# Patient Record
Sex: FEMALE | Race: WHITE | NOT HISPANIC OR LATINO | Employment: UNEMPLOYED | URBAN - METROPOLITAN AREA
[De-identification: names, ages, dates, MRNs, and addresses within clinical notes are randomized per-mention and may not be internally consistent; named-entity substitution may affect disease eponyms.]

---

## 2020-09-17 ENCOUNTER — HOSPITAL ENCOUNTER (INPATIENT)
Facility: CLINIC | Age: 40
LOS: 1 days | Discharge: HOME | DRG: 897 | End: 2020-09-19
Attending: EMERGENCY MEDICINE | Admitting: PSYCHIATRY & NEUROLOGY
Payer: MEDICAID

## 2020-09-17 DIAGNOSIS — F10.220 ALCOHOL DEPENDENCE WITH UNCOMPLICATED INTOXICATION (H): ICD-10-CM

## 2020-09-17 DIAGNOSIS — F10.939 ALCOHOL WITHDRAWAL SYNDROME WITH COMPLICATION, WITH UNSPECIFIED COMPLICATION (H): Primary | ICD-10-CM

## 2020-09-17 LAB
ALBUMIN SERPL-MCNC: 3.6 G/DL (ref 3.4–5)
ALCOHOL BREATH TEST: 0.25 (ref 0–0.01)
ALP SERPL-CCNC: 49 U/L (ref 40–150)
ALT SERPL W P-5'-P-CCNC: 25 U/L (ref 0–50)
AMPHETAMINES UR QL SCN: NEGATIVE
ANION GAP SERPL CALCULATED.3IONS-SCNC: 9 MMOL/L (ref 3–14)
AST SERPL W P-5'-P-CCNC: 27 U/L (ref 0–45)
BARBITURATES UR QL: NEGATIVE
BASOPHILS # BLD AUTO: 0 10E9/L (ref 0–0.2)
BASOPHILS NFR BLD AUTO: 0.2 %
BENZODIAZ UR QL: NEGATIVE
BILIRUB SERPL-MCNC: 0.2 MG/DL (ref 0.2–1.3)
BUN SERPL-MCNC: 15 MG/DL (ref 7–30)
CALCIUM SERPL-MCNC: 8.1 MG/DL (ref 8.5–10.1)
CANNABINOIDS UR QL SCN: NEGATIVE
CHLORIDE SERPL-SCNC: 108 MMOL/L (ref 94–109)
CO2 SERPL-SCNC: 25 MMOL/L (ref 20–32)
COCAINE UR QL: NEGATIVE
CREAT SERPL-MCNC: 0.69 MG/DL (ref 0.52–1.04)
DIFFERENTIAL METHOD BLD: ABNORMAL
EOSINOPHIL # BLD AUTO: 0.1 10E9/L (ref 0–0.7)
EOSINOPHIL NFR BLD AUTO: 0.8 %
ERYTHROCYTE [DISTWIDTH] IN BLOOD BY AUTOMATED COUNT: 11.1 % (ref 10–15)
ETHANOL UR QL SCN: POSITIVE
GFR SERPL CREATININE-BSD FRML MDRD: >90 ML/MIN/{1.73_M2}
GLUCOSE SERPL-MCNC: 127 MG/DL (ref 70–99)
HCG UR QL: NEGATIVE
HCT VFR BLD AUTO: 42.7 % (ref 35–47)
HGB BLD-MCNC: 15.1 G/DL (ref 11.7–15.7)
IMM GRANULOCYTES # BLD: 0 10E9/L (ref 0–0.4)
IMM GRANULOCYTES NFR BLD: 0.1 %
LYMPHOCYTES # BLD AUTO: 2.9 10E9/L (ref 0.8–5.3)
LYMPHOCYTES NFR BLD AUTO: 24.2 %
MAGNESIUM SERPL-MCNC: 2.4 MG/DL (ref 1.6–2.3)
MCH RBC QN AUTO: 34.9 PG (ref 26.5–33)
MCHC RBC AUTO-ENTMCNC: 35.4 G/DL (ref 31.5–36.5)
MCV RBC AUTO: 99 FL (ref 78–100)
MONOCYTES # BLD AUTO: 0.3 10E9/L (ref 0–1.3)
MONOCYTES NFR BLD AUTO: 2.5 %
NEUTROPHILS # BLD AUTO: 8.5 10E9/L (ref 1.6–8.3)
NEUTROPHILS NFR BLD AUTO: 72.2 %
NRBC # BLD AUTO: 0 10*3/UL
NRBC BLD AUTO-RTO: 0 /100
OPIATES UR QL SCN: NEGATIVE
PLATELET # BLD AUTO: 321 10E9/L (ref 150–450)
POTASSIUM SERPL-SCNC: 3.8 MMOL/L (ref 3.4–5.3)
PROT SERPL-MCNC: 7.2 G/DL (ref 6.8–8.8)
RBC # BLD AUTO: 4.33 10E12/L (ref 3.8–5.2)
SODIUM SERPL-SCNC: 142 MMOL/L (ref 133–144)
WBC # BLD AUTO: 11.8 10E9/L (ref 4–11)

## 2020-09-17 PROCEDURE — HZ2ZZZZ DETOXIFICATION SERVICES FOR SUBSTANCE ABUSE TREATMENT: ICD-10-PCS | Performed by: PSYCHIATRY & NEUROLOGY

## 2020-09-17 PROCEDURE — 80053 COMPREHEN METABOLIC PANEL: CPT | Performed by: EMERGENCY MEDICINE

## 2020-09-17 PROCEDURE — 83735 ASSAY OF MAGNESIUM: CPT | Performed by: EMERGENCY MEDICINE

## 2020-09-17 PROCEDURE — 80320 DRUG SCREEN QUANTALCOHOLS: CPT | Performed by: EMERGENCY MEDICINE

## 2020-09-17 PROCEDURE — 82075 ASSAY OF BREATH ETHANOL: CPT | Performed by: EMERGENCY MEDICINE

## 2020-09-17 PROCEDURE — 84443 ASSAY THYROID STIM HORMONE: CPT | Performed by: EMERGENCY MEDICINE

## 2020-09-17 PROCEDURE — 81025 URINE PREGNANCY TEST: CPT | Performed by: EMERGENCY MEDICINE

## 2020-09-17 PROCEDURE — 99284 EMERGENCY DEPT VISIT MOD MDM: CPT | Mod: Z6 | Performed by: EMERGENCY MEDICINE

## 2020-09-17 PROCEDURE — 99285 EMERGENCY DEPT VISIT HI MDM: CPT | Mod: 25 | Performed by: EMERGENCY MEDICINE

## 2020-09-17 PROCEDURE — 81001 URINALYSIS AUTO W/SCOPE: CPT | Performed by: EMERGENCY MEDICINE

## 2020-09-17 PROCEDURE — 80307 DRUG TEST PRSMV CHEM ANLYZR: CPT | Performed by: EMERGENCY MEDICINE

## 2020-09-17 PROCEDURE — 82977 ASSAY OF GGT: CPT | Performed by: EMERGENCY MEDICINE

## 2020-09-17 PROCEDURE — 25800030 ZZH RX IP 258 OP 636: Performed by: EMERGENCY MEDICINE

## 2020-09-17 PROCEDURE — 85025 COMPLETE CBC W/AUTO DIFF WBC: CPT | Performed by: EMERGENCY MEDICINE

## 2020-09-17 RX ORDER — HYDROXYZINE HYDROCHLORIDE 25 MG/1
25-50 TABLET, FILM COATED ORAL EVERY 4 HOURS PRN
Status: ON HOLD | COMMUNITY
End: 2021-10-10

## 2020-09-17 RX ADMIN — SODIUM CHLORIDE 1000 ML: 9 INJECTION, SOLUTION INTRAVENOUS at 22:57

## 2020-09-17 ASSESSMENT — ENCOUNTER SYMPTOMS
FEVER: 0
CONFUSION: 0
SHORTNESS OF BREATH: 0
EYE REDNESS: 0
HEADACHES: 0
ARTHRALGIAS: 0
COLOR CHANGE: 0
DIFFICULTY URINATING: 0
ABDOMINAL PAIN: 0
NECK STIFFNESS: 0

## 2020-09-18 LAB
ALBUMIN UR-MCNC: NEGATIVE MG/DL
APPEARANCE UR: ABNORMAL
BACTERIA #/AREA URNS HPF: ABNORMAL /HPF
BILIRUB UR QL STRIP: NEGATIVE
COLOR UR AUTO: ABNORMAL
FOLATE SERPL-MCNC: 5.1 NG/ML
GGT SERPL-CCNC: 22 U/L (ref 0–40)
GLUCOSE UR STRIP-MCNC: NEGATIVE MG/DL
HGB UR QL STRIP: ABNORMAL
KETONES UR STRIP-MCNC: NEGATIVE MG/DL
LABORATORY COMMENT REPORT: NORMAL
LEUKOCYTE ESTERASE UR QL STRIP: NEGATIVE
MUCOUS THREADS #/AREA URNS LPF: PRESENT /LPF
NITRATE UR QL: NEGATIVE
PH UR STRIP: 6.5 PH (ref 5–7)
RBC #/AREA URNS AUTO: 0 /HPF (ref 0–2)
SARS-COV-2 RNA SPEC QL NAA+PROBE: NEGATIVE
SARS-COV-2 RNA SPEC QL NAA+PROBE: NORMAL
SOURCE: ABNORMAL
SP GR UR STRIP: 1.01 (ref 1–1.03)
SPECIMEN SOURCE: NORMAL
SPECIMEN SOURCE: NORMAL
SQUAMOUS #/AREA URNS AUTO: 27 /HPF (ref 0–1)
TSH SERPL DL<=0.005 MIU/L-ACNC: 1.14 MU/L (ref 0.4–4)
UROBILINOGEN UR STRIP-MCNC: NORMAL MG/DL (ref 0–2)
VIT B12 SERPL-MCNC: 342 PG/ML (ref 193–986)
WBC #/AREA URNS AUTO: 1 /HPF (ref 0–5)

## 2020-09-18 PROCEDURE — 25000132 ZZH RX MED GY IP 250 OP 250 PS 637: Performed by: NURSE PRACTITIONER

## 2020-09-18 PROCEDURE — 12800008 ZZH R&B CD ADULT

## 2020-09-18 PROCEDURE — 82607 VITAMIN B-12: CPT | Performed by: NURSE PRACTITIONER

## 2020-09-18 PROCEDURE — 99207 ZZC CONSULT E&M CHANGED TO SUBSEQUENT LEVEL: CPT | Performed by: NURSE PRACTITIONER

## 2020-09-18 PROCEDURE — 99221 1ST HOSP IP/OBS SF/LOW 40: CPT | Mod: 95 | Performed by: PSYCHIATRY & NEUROLOGY

## 2020-09-18 PROCEDURE — 99231 SBSQ HOSP IP/OBS SF/LOW 25: CPT | Performed by: NURSE PRACTITIONER

## 2020-09-18 PROCEDURE — 99207 ZZC CDG-HISTORY COMP: MEETS EXP. PROBLEM FOCUSED-DOWN CODED LACK OF ROS: CPT | Performed by: PSYCHIATRY & NEUROLOGY

## 2020-09-18 PROCEDURE — 36415 COLL VENOUS BLD VENIPUNCTURE: CPT | Performed by: NURSE PRACTITIONER

## 2020-09-18 PROCEDURE — 82746 ASSAY OF FOLIC ACID SERUM: CPT | Performed by: NURSE PRACTITIONER

## 2020-09-18 PROCEDURE — 25000132 ZZH RX MED GY IP 250 OP 250 PS 637: Performed by: EMERGENCY MEDICINE

## 2020-09-18 PROCEDURE — U0003 INFECTIOUS AGENT DETECTION BY NUCLEIC ACID (DNA OR RNA); SEVERE ACUTE RESPIRATORY SYNDROME CORONAVIRUS 2 (SARS-COV-2) (CORONAVIRUS DISEASE [COVID-19]), AMPLIFIED PROBE TECHNIQUE, MAKING USE OF HIGH THROUGHPUT TECHNOLOGIES AS DESCRIBED BY CMS-2020-01-R: HCPCS | Performed by: NURSE PRACTITIONER

## 2020-09-18 RX ORDER — FOLIC ACID 1 MG/1
1 TABLET ORAL DAILY
Status: DISCONTINUED | OUTPATIENT
Start: 2020-09-18 | End: 2020-09-19 | Stop reason: HOSPADM

## 2020-09-18 RX ORDER — ALUMINA, MAGNESIA, AND SIMETHICONE 2400; 2400; 240 MG/30ML; MG/30ML; MG/30ML
30 SUSPENSION ORAL EVERY 4 HOURS PRN
Status: DISCONTINUED | OUTPATIENT
Start: 2020-09-18 | End: 2020-09-19 | Stop reason: HOSPADM

## 2020-09-18 RX ORDER — DIAZEPAM 5 MG
5-20 TABLET ORAL EVERY 30 MIN PRN
Status: DISCONTINUED | OUTPATIENT
Start: 2020-09-18 | End: 2020-09-19 | Stop reason: HOSPADM

## 2020-09-18 RX ORDER — ACETAMINOPHEN 325 MG/1
650 TABLET ORAL EVERY 4 HOURS PRN
Status: DISCONTINUED | OUTPATIENT
Start: 2020-09-18 | End: 2020-09-19 | Stop reason: HOSPADM

## 2020-09-18 RX ORDER — IBUPROFEN 600 MG/1
600 TABLET, FILM COATED ORAL ONCE
Status: COMPLETED | OUTPATIENT
Start: 2020-09-18 | End: 2020-09-18

## 2020-09-18 RX ORDER — HYDROXYZINE HYDROCHLORIDE 25 MG/1
25 TABLET, FILM COATED ORAL 3 TIMES DAILY PRN
Status: DISCONTINUED | OUTPATIENT
Start: 2020-09-18 | End: 2020-09-19 | Stop reason: HOSPADM

## 2020-09-18 RX ORDER — MULTIPLE VITAMINS W/ MINERALS TAB 9MG-400MCG
1 TAB ORAL DAILY
Status: DISCONTINUED | OUTPATIENT
Start: 2020-09-18 | End: 2020-09-19 | Stop reason: HOSPADM

## 2020-09-18 RX ORDER — BISACODYL 10 MG
10 SUPPOSITORY, RECTAL RECTAL DAILY PRN
Status: DISCONTINUED | OUTPATIENT
Start: 2020-09-18 | End: 2020-09-19 | Stop reason: HOSPADM

## 2020-09-18 RX ORDER — ATENOLOL 50 MG/1
50 TABLET ORAL DAILY PRN
Status: DISCONTINUED | OUTPATIENT
Start: 2020-09-18 | End: 2020-09-19 | Stop reason: HOSPADM

## 2020-09-18 RX ORDER — TRAZODONE HYDROCHLORIDE 50 MG/1
50 TABLET, FILM COATED ORAL
Status: DISCONTINUED | OUTPATIENT
Start: 2020-09-18 | End: 2020-09-19 | Stop reason: HOSPADM

## 2020-09-18 RX ORDER — LANOLIN ALCOHOL/MO/W.PET/CERES
100 CREAM (GRAM) TOPICAL DAILY
Status: DISCONTINUED | OUTPATIENT
Start: 2020-09-18 | End: 2020-09-19 | Stop reason: HOSPADM

## 2020-09-18 RX ORDER — HYDROXYZINE HYDROCHLORIDE 25 MG/1
25 TABLET, FILM COATED ORAL EVERY 4 HOURS PRN
Status: DISCONTINUED | OUTPATIENT
Start: 2020-09-18 | End: 2020-09-19 | Stop reason: HOSPADM

## 2020-09-18 RX ADMIN — HYDROXYZINE HYDROCHLORIDE 25 MG: 25 TABLET, FILM COATED ORAL at 20:08

## 2020-09-18 RX ADMIN — IBUPROFEN 600 MG: 600 TABLET ORAL at 04:38

## 2020-09-18 RX ADMIN — TRAZODONE HYDROCHLORIDE 50 MG: 50 TABLET ORAL at 20:08

## 2020-09-18 RX ADMIN — DIAZEPAM 10 MG: 5 TABLET ORAL at 05:36

## 2020-09-18 RX ADMIN — MULTIPLE VITAMINS W/ MINERALS TAB 1 TABLET: TAB at 09:32

## 2020-09-18 RX ADMIN — FOLIC ACID 1 MG: 1 TABLET ORAL at 09:32

## 2020-09-18 RX ADMIN — Medication 100 MG: at 09:32

## 2020-09-18 RX ADMIN — DIAZEPAM 10 MG: 5 TABLET ORAL at 09:32

## 2020-09-18 ASSESSMENT — MIFFLIN-ST. JEOR: SCORE: 1101.34

## 2020-09-18 ASSESSMENT — ACTIVITIES OF DAILY LIVING (ADL)
ORAL_HYGIENE: INDEPENDENT
FALL_HISTORY_WITHIN_LAST_SIX_MONTHS: NO
TRANSFERRING: 0-->INDEPENDENT
AMBULATION: 0-->INDEPENDENT
COGNITION: 0 - NO COGNITION ISSUES REPORTED
RETIRED_COMMUNICATION: 0-->UNDERSTANDS/COMMUNICATES WITHOUT DIFFICULTY
RETIRED_EATING: 0-->INDEPENDENT
HYGIENE/GROOMING: INDEPENDENT
SWALLOWING: 0-->SWALLOWS FOODS/LIQUIDS WITHOUT DIFFICULTY
DRESS: INDEPENDENT
DRESS: 0-->INDEPENDENT
TOILETING: 0-->INDEPENDENT
BATHING: 0-->INDEPENDENT

## 2020-09-18 NOTE — ED TRIAGE NOTES
Binge drinking for 3 days. Struggling with ETOH for years; 1L a day; denies any other drug use. Pt unable to track but family states SI statements

## 2020-09-18 NOTE — ED NOTES
ED to Behavioral Floor Handoff    SITUATION  Adri Delarosa is a 39 year old female who speaks Data Unavailable and lives in a home with family members The patient arrived in the ED by private car from home with a complaint of Alcohol Intoxication and Suicidal (possible SI, pt unable to track, but family states multiple statements about SI)  .The patient's current symptoms started/worsened 1 year(s) ago and during this time the symptoms have increased.   In the ED, pt was diagnosed with   Final diagnoses:   Alcohol dependence with uncomplicated intoxication (H)        Initial vitals were: BP: 129/85  Pulse: 94  Temp: 98.5  F (36.9  C)  Resp: 12  SpO2: 97 %   --------  Is the patient diabetic? No   If yes, last blood glucose? --     If yes, was this treated in the ED? --  --------  Is the patient inebriated (ETOH) Yes or Impaired on other substances? No  MSSA done? N/A  Last MSSA score: --    Were withdrawal symptoms treated? N/A  Does the patient have a seizure history? No. If yes, date of most recent seizure--  --------  Is the patient patient experiencing suicidal ideation? reports occasional suicidal thoughts representing feeling that life is not worth feeling    Homicidal ideation? denies current or recent homicidal ideation or behaviors.    Self-injurious behavior/urges? denies current or recent self injurious behavior or ideation.  ------  Was pt aggressive in the ED No  Was a code called No  Is the pt now cooperative? Yes  -------  Meds given in ED:   Medications   0.9% sodium chloride BOLUS (1,000 mLs Intravenous New Bag 9/17/20 5623)      Family present during ED course? Yes  Family currently present? Yes    BACKGROUND  Does the patient have a cognitive impairment or developmental disability? No  Allergies: No Known Allergies.   Social demographics are   Social History     Socioeconomic History     Marital status: Not on file     Spouse name: Not on file     Number of children: Not on file     Years of  education: Not on file     Highest education level: Not on file   Occupational History     Not on file   Social Needs     Financial resource strain: Not on file     Food insecurity     Worry: Not on file     Inability: Not on file     Transportation needs     Medical: Not on file     Non-medical: Not on file   Tobacco Use     Smoking status: Not on file   Substance and Sexual Activity     Alcohol use: Not on file     Drug use: Not on file     Sexual activity: Not on file   Lifestyle     Physical activity     Days per week: Not on file     Minutes per session: Not on file     Stress: Not on file   Relationships     Social connections     Talks on phone: Not on file     Gets together: Not on file     Attends Mu-ism service: Not on file     Active member of club or organization: Not on file     Attends meetings of clubs or organizations: Not on file     Relationship status: Not on file     Intimate partner violence     Fear of current or ex partner: Not on file     Emotionally abused: Not on file     Physically abused: Not on file     Forced sexual activity: Not on file   Other Topics Concern     Not on file   Social History Narrative     Not on file        ASSESSMENT  Labs results   Labs Ordered and Resulted from Time of ED Arrival Up to the Time of Departure from the ED   DRUG ABUSE SCREEN 6 CHEM DEP URINE ([facility]) - Abnormal; Notable for the following components:       Result Value    Ethanol Qual Urine Positive (*)     All other components within normal limits   COMPREHENSIVE METABOLIC PANEL - Abnormal; Notable for the following components:    Glucose 127 (*)     Calcium 8.1 (*)     All other components within normal limits   CBC WITH PLATELETS DIFFERENTIAL - Abnormal; Notable for the following components:    WBC 11.8 (*)     MCH 34.9 (*)     Absolute Neutrophil 8.5 (*)     All other components within normal limits   MAGNESIUM - Abnormal; Notable for the following components:    Magnesium 2.4 (*)     All other  components within normal limits   ROUTINE UA WITH MICROSCOPIC - Abnormal; Notable for the following components:    Blood Urine Trace (*)     Bacteria Urine Few (*)     Squamous Epithelial /HPF Urine 27 (*)     Mucous Urine Present (*)     All other components within normal limits   ALCOHOL BREATH TEST POCT - Abnormal; Notable for the following components:    Alcohol Breath Test 0.246 (*)     All other components within normal limits   HCG QUALITATIVE URINE   COVID-19 VIRUS (CORONAVIRUS) BY PCR      Imaging Studies: No results found for this or any previous visit (from the past 24 hour(s)).   Most recent vital signs /85   Pulse 94   Temp 98.5  F (36.9  C) (Oral)   Resp 12   SpO2 97%    Abnormal labs/tests/findings requiring intervention:---   Pain control: pt had none  Nausea control: pt had none    RECOMMENDATION  Are any infection precautions needed (MRSA, VRE, etc.)? No If yes, what infection? --  ---  Does the patient have mobility issues? independently. If yes, what device does the pt use? ---  ---  Is patient on 72 hour hold or commitment? No If on 72 hour hold, have hold and rights been given to patient? N/A  Are admitting orders written if after 10 p.m. ?N/A  Tasks needing to be completed:---     Angeles Scott, RN    [phone] [facility]

## 2020-09-18 NOTE — H&P
"Admitted:     09/17/2020      IDENTIFYING INFORMATION:  The patient is a 39-year-old  female.  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      CHIEF COMPLAINT:  \"Got drunk.\"       HISTORY OF PRESENT ILLNESS:  The patient has been drinking since age of 14.  At 28, it became more of a problem.  She relapsed after 2-1/2 years of sobriety and was binge drinking.  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  She has tolerance to alcohol, withdrawal, progressive use, loss of control, and tried to quit unsuccessfully, despite negative consequences such as money.  She has no history of DTS and seizures.  She does not use any drugs.  Smokes 1-2 cigarettes a day.  She does have chronic depression.  She says she is not depressed right now.  She does not have any suicidal ideation, plan or intent.  She says she has her kids.  She has no access to a gun.  She did make 1 prior suicide attempt 2 months ago but gets very irritable when pressed for details about it.  When she gets depressed, her sleep goes down.  Her energy goes down.  Her motivation goes down, and her appetite goes up.  She does not have any auditory or visual hallucinations.  She denies any yusra.  She denies any PTSD.  She denies any psychosis.  She denies any OCD.        PAST PSYCHIATRIC HISTORY:  She was psychiatrically hospitalized once.  She was in treatment at [facility].      MEDICAL HISTORY:  The patient's vitals are as below:        VITAL SIGNS:  Blood pressure 133/76;, pulse is 85; respiratory rate is 16; temperature is 98.        ALLERGIES:  SHE HAS NO KNOWN ALLERGIES.      FAMILY HISTORY:  Denies any alcohol.  No chemical dependency issues in her family.  No psychiatric issues in the family.      SOCIAL HISTORY:  She was born in [place].  She grew up in [place].  She is single, has 3 children.      MENTAL STATUS EXAMINATION:  The patient is a 39-year-old  female lying in bed.  She " has poor grooming, poor hygiene, and poor eye contact.  Mood is depressed.  Affect is congruent.  Speech is spontaneous, normal in rate, rhythm and volume.  Psychomotor retardation is seen.  Linear thought process, no loosening of association.  Does not have any active suicidal ideation, plan or intent.  Insight and judgment are limited.  Alert and oriented times 3.  Attention, concentration is intact.  Recent and remote memory intact.  Language and fund of knowledge intact.  Normal muscle strength, normal gait.      DIAGNOSES:   Axis I:   1.  Alcohol use disorder, severe.   2.  Alcohol withdrawal, severe.   3.  Major depressive disorder, moderate, recurrent, without psychotic features.      PLAN:  The patient will be detoxed off alcohol using MSSA protocol on Valium.  The patient has tremor, sleep disturbance, agitation, and paroxysmal sweats.  Her pulse is 85, blood pressure 123/76.  She has scored 9 and 9 on the MSSA and has required 20 mg of Valium.  The patient had lab work done, which are basically essentially normal.  Her folate is low at 5.4, but she is getting folate replacement right now.  She will be seen by Medicine for it.  She has stopped taking Prozac for her depression.  Initially, she will started back on Prozac 20 mg.      I did medication reconciliation and provided psychoeducation.  I have reviewed labs with the patient and talked about this with the patient.  I have reviewed and summarized old records including medication, reconciliation of home medications and PDMP.  I have spoken with the nurse about medications and withdrawal scores.  I have spoken with the  about her treatment options.         EMILIE BLUE MD             D: 2020   T: 2020   MT:       Name:     HECTOR PAUL   MRN:      [id number]        Account:      [id number]   :      1980        Admitted:     2020                   Document: [id number]

## 2020-09-18 NOTE — PROGRESS NOTES
Met with Pt to initiate discharge planning.  Pt reports she is working on getting into [facility].  Pt is in bed and sleepy.  Will offer assistance as Pt condition improves.

## 2020-09-18 NOTE — PLAN OF CARE
Continues in detox status on alcohol withdrawal protocol. MSSA 9 and 7. Medicated x1 with Valium 10 mg. for symptoms of alcohol withdrawal.Appetite fair. Fluids encouraged. Spent most of the day resting in bed. Agreed to Covid test.Denies suicide ideation and thoughts of self harm. Will continue to monitor.

## 2020-09-18 NOTE — PLAN OF CARE
Behavioral Team Discussion: (9/18/2020)    Continued Stay Criteria/Rationale: Patient admitted for alcohol withdrawal and alcohol Use Disorder.  Plan: The following services will be provided to the patient; psychiatric assessment, medication management, therapeutic milieu, individual and group support, and skills groups.   Participants: 3A Provider: Dr. Pia Chen MD; 3A RN's: Carolynn Espinal, RN; 3A CM's: Colette Boswell .  Summary/Recommendation: Providers will assess today for treatment recommendations, discharge planning, and aftercare plans. CM will meet with pt for discharge planning.   Medical/Physical: None noted  Precautions:   Behavioral Orders   Procedures     Code 1 - Restrict to Unit     Routine Programming     As clinically indicated     Status 15     Every 15 minutes.     Withdrawal precautions     Rationale for change in precautions or plan: N/A  Progress: No Change.

## 2020-09-18 NOTE — H&P
Telemedicine Visit: The patient's condition can be safely assessed and treated via synchronous audio and visual telemedicine encounter.   Start Time: 8.46  Stop Time: 9.04  Reason for Telemedicine Visit: Covid-19   Originating Site (Patient Location): Station    Distant Site (Provider Location): Provider Remote Setting   Consent: The patient/guardian has verbally consented to: the potential risks and benefits of telemedicine (video visit) versus in person care; bill my insurance or make self-payment for services provided; and responsibility for payment of non-covered services.   Mode of Communication: Video Conference via polycom  As the provider I attest to compliance with applicable laws and regulations related to telemedicine.       The patient/guardian has been notified of the following:   This telemedicine visit is conducted live between you and your clinician. We have found that certain health care needs can be provided without the need for a physical exam. This service lets us provide the care you need with a telemedicine conversation.      [id number]

## 2020-09-18 NOTE — CONSULTS
See consult note from 12:57.  Written as progress note. This note is to satisfy order.     Freya May, MPH, [facility]  Hospitalist Service  Pager [phone]

## 2020-09-18 NOTE — PHARMACY-ADMISSION MEDICATION HISTORY
Admission Medication History Completed by Pharmacy    See [facility] Admission Navigator for allergy information, preferred outpatient pharmacy, prior to admission medications and immunization status.     Medication History Sources:     Patient    Changes made to PTA medication list (reason):    Added: Fluoxetine    Deleted: None    Changed: hydroxyzine -> 25-50 mg Q4 hours PRN anxiety    Additional Information:    Medications per patient. Unable to locate information about medications via Surescripts or Care Everywhere.    Prior to Admission medications    Medication Sig Last Dose Taking? Auth Provider   FLUoxetine (PROZAC) 10 MG capsule Take 10 mg by mouth daily Never started Yes Unknown, Entered By History   hydrOXYzine (ATARAX) 25 MG tablet Take 25-50 mg by mouth every 4 hours as needed for anxiety  9/17/2020 at Unknown time Yes Reported, Patient       Date completed: 09/18/20    Medication history completed by: Nano Rinaldi, PharmD, BCPP

## 2020-09-18 NOTE — PROGRESS NOTES
[facility]  Consult Note - Hospitalist Service     Date of Admission:  September 17, 2020   Consult Requested by: Dr. Chen, psychiatry   Reason for Consult: Mild leukocytosis in setting of alcohol withdrawal     Assessment & Plan   Adri Delarosa is a 39 year old female admitted on 9/17/2020.  She has a past medical history of heavy alcohol abuse (drinking 1L per day) who is admitted to station 3A seeking detoxification from alcohol.  Internal medicine has been consulted for mild leukocytosis in setting of alcohol withdrawal.     #Alcohol dependence with acute withdrawal  Pt has been drinking heavily for the past year, currently drinking 1L/day for past several days.  Last drink 9/17.  LFTs WNL.    - Agree with MSSA  - Agree with MVI, thiamine, folic acid  - Care per primary psychiatry team  - Encourage PO fluids    #Mild leukocytosis   WBC 11.8, afebrile without any concerning findings on exam.  No concern for infection at this time.   - Follow up with PCP within one week for repeat CBC (placed in discharge orders)    Currently, this patient is medically stable and medicine will sign off. Please page the Internal Medicine job code pager for any intercurrent medical issues which arise. Thank you for the opportunity to be a part of this patient's care.    The patient's care was discussed with the Bedside Nurse, Patient and Primary team.    Freya May NP  [facility]    ______________________________________________________________________    Chief Complaint   Mild leukocytosis in setting of acute alcohol withdrawal.    History is obtained from the patient and electronic health record    History of Present Illness   Adir Delarosa is a 39 year old female who has a past medical history of heavy alcohol abuse who is admitted to station 3A seeking detoxification from alcohol.  Internal medicine has been consulted for mild  "leukocytosis in setting of alcohol withdrawal.     Currently, she has no medical complaints.  Denies fevers, chills, nausea, vomiting, chest pain or shortness of breath.  No abdominal pain or dysuria.      Review of Systems   The 10 point Review of Systems is negative other than noted in the HPI or here.     Past Medical History    I have reviewed this patient's medical history and updated it with pertinent information if needed.   Past Medical History:   Diagnosis Date     Alcohol abuse       Past Surgical History   I have reviewed this patient's surgical history and updated it with pertinent information if needed.  No past surgical history on file.     Social History   I have reviewed this patient's social history and updated it with pertinent information if needed.  Social History     Tobacco Use     Smoking status: Not on file   Substance Use Topics     Alcohol use: Not on file     Drug use: Not on file     Family History   I have reviewed this patient's family history and updated it with pertinent information if needed.   No significant family history     Medications   I have reviewed this patient's current medications    Allergies   No Known Allergies    Physical Exam   Vital signs:  Temp: 97.5  F (36.4  C) Temp src: Temporal BP: (!) 128/90 Pulse: 94   Resp: 16 SpO2: 98 % O2 Device: None (Room air)   Height: 154.9 cm (5' 1\") Weight: 49.9 kg (110 lb)  Estimated body mass index is 20.78 kg/m  as calculated from the following:    Height as of this encounter: 1.549 m (5' 1\").    Weight as of this encounter: 49.9 kg (110 lb).  Weight: 0 lbs 0 oz    General Appearance: NAD, pleasant and interactive.   Eyes: PERRLA  HEENT: MMM, no lymphadenopathy.    Respiratory: Normal effort on RA, lungs CTAB.  No wheezing.   Cardiovascular: RRR, S1S2.  No murmurs appreciated.   GI: Abdomen soft, non-tender, non-distended. Bowel sounds hypoactive throughout.   Skin: No jaundice.  No rashes or open wounds on visualized skin. "   Musculoskeletal: No joint swelling or tenderness .  Neurologic: A+O x 3, no focal deficits.   Psychiatric: Mood stable.     Data   Results for orders placed or performed during the hospital encounter of 09/17/20 (from the past 24 hour(s))   Alcohol breath test POCT   Result Value Ref Range    Alcohol Breath Test 0.246 (A) 0.00 - 0.01   HCG qualitative urine   Result Value Ref Range    HCG Qual Urine Negative NEG^Negative   Drug abuse screen 6 urine (chem dep)   Result Value Ref Range    Amphetamine Qual Urine Negative NEG^Negative    Barbiturates Qual Urine Negative NEG^Negative    Benzodiazepine Qual Urine Negative NEG^Negative    Cannabinoids Qual Urine Negative NEG^Negative    Cocaine Qual Urine Negative NEG^Negative    Ethanol Qual Urine Positive (A) NEG^Negative    Opiates Qualitative Urine Negative NEG^Negative   UA with Microscopic   Result Value Ref Range    Color Urine Light Yellow     Appearance Urine Slightly Cloudy     Glucose Urine Negative NEG^Negative mg/dL    Bilirubin Urine Negative NEG^Negative    Ketones Urine Negative NEG^Negative mg/dL    Specific Gravity Urine 1.006 1.003 - 1.035    Blood Urine Trace (A) NEG^Negative    pH Urine 6.5 5.0 - 7.0 pH    Protein Albumin Urine Negative NEG^Negative mg/dL    Urobilinogen mg/dL Normal 0.0 - 2.0 mg/dL    Nitrite Urine Negative NEG^Negative    Leukocyte Esterase Urine Negative NEG^Negative    Source Urine     WBC Urine 1 0 - 5 /HPF    RBC Urine 0 0 - 2 /HPF    Bacteria Urine Few (A) NEG^Negative /HPF    Squamous Epithelial /HPF Urine 27 (H) 0 - 1 /HPF    Mucous Urine Present (A) NEG^Negative /LPF   Comprehensive metabolic panel   Result Value Ref Range    Sodium 142 133 - 144 mmol/L    Potassium 3.8 3.4 - 5.3 mmol/L    Chloride 108 94 - 109 mmol/L    Carbon Dioxide 25 20 - 32 mmol/L    Anion Gap 9 3 - 14 mmol/L    Glucose 127 (H) 70 - 99 mg/dL    Urea Nitrogen 15 7 - 30 mg/dL    Creatinine 0.69 0.52 - 1.04 mg/dL    GFR Estimate >90 >60 mL/min/[1.73_m2]     GFR Estimate If Black >90 >60 mL/min/[1.73_m2]    Calcium 8.1 (L) 8.5 - 10.1 mg/dL    Bilirubin Total 0.2 0.2 - 1.3 mg/dL    Albumin 3.6 3.4 - 5.0 g/dL    Protein Total 7.2 6.8 - 8.8 g/dL    Alkaline Phosphatase 49 40 - 150 U/L    ALT 25 0 - 50 U/L    AST 27 0 - 45 U/L   CBC with platelets differential   Result Value Ref Range    WBC 11.8 (H) 4.0 - 11.0 10e9/L    RBC Count 4.33 3.8 - 5.2 10e12/L    Hemoglobin 15.1 11.7 - 15.7 g/dL    Hematocrit 42.7 35.0 - 47.0 %    MCV 99 78 - 100 fl    MCH 34.9 (H) 26.5 - 33.0 pg    MCHC 35.4 31.5 - 36.5 g/dL    RDW 11.1 10.0 - 15.0 %    Platelet Count 321 150 - 450 10e9/L    Diff Method Automated Method     % Neutrophils 72.2 %    % Lymphocytes 24.2 %    % Monocytes 2.5 %    % Eosinophils 0.8 %    % Basophils 0.2 %    % Immature Granulocytes 0.1 %    Nucleated RBCs 0 0 /100    Absolute Neutrophil 8.5 (H) 1.6 - 8.3 10e9/L    Absolute Lymphocytes 2.9 0.8 - 5.3 10e9/L    Absolute Monocytes 0.3 0.0 - 1.3 10e9/L    Absolute Eosinophils 0.1 0.0 - 0.7 10e9/L    Absolute Basophils 0.0 0.0 - 0.2 10e9/L    Abs Immature Granulocytes 0.0 0 - 0.4 10e9/L    Absolute Nucleated RBC 0.0    Magnesium   Result Value Ref Range    Magnesium 2.4 (H) 1.6 - 2.3 mg/dL   GGT   Result Value Ref Range    GGT 22 0 - 40 U/L   TSH with free T4 reflex   Result Value Ref Range    TSH 1.14 0.40 - 4.00 mU/L   Folate   Result Value Ref Range    Folate 5.1 (L) >5.4 ng/mL   Vitamin B12   Result Value Ref Range    Vitamin B12 342 193 - 986 pg/mL   Asymptomatic COVID-19 Virus (Coronavirus) by PCR    Specimen: Nasopharyngeal   Result Value Ref Range    COVID-19 Virus PCR to [facility] - Source Nasopharyngeal     COVID-19 Virus PCR to [facility] - Result       Test received-See reflex to IDDL test SARS CoV2 (COVID-19) Virus RT-PCR

## 2020-09-18 NOTE — ED NOTES
"Pt refused COVID swab, states she was \"Just tested\". Rn explained that the patient was still out in the community and needs to be retested. Continues to refuse  "

## 2020-09-18 NOTE — ED NOTES
Writer showed the Behavioral video to patient . Patient informed that since she refuse the Covid test, pt will be staying in an isolation room where she cannot go out. Patient agreeable.

## 2020-09-18 NOTE — PROGRESS NOTES
**RN Admission Note**    S: Patient is a 39 year old year old female admitted for Alcohol dependence with uncomplicated intoxication (H) [F10.220]  BAL upon arrival was 0.26.  Pt refused covid test in ED.    B: Patient was brought to [facility] ED    Patient stated she had stopped drinking for 2 1/2 years and started drinking 1 liter a day for the past 4 days.  Pt denies being suicidal. Pt has a long hx of drinking etoh.  Pt stated she had been to [place] x3.    A:Patient presents irritable and labile.  MSSA score was 9.  She received valium 10 mg po.  Pt stated she tried to commit suicide 2 months ago by overdosing on pills.  Pt was irritable and tired refused to answer suicide questions although she denies being suicidal currently.      /88   Pulse 75   Temp 97.8  F (36.6  C) (Oral)   Resp 16   SpO2 97%     R: Patient was given a tour of the unit, a copy of the patient bill of rights, and a snack. Admission orders were placed.  Unit staff will continue to monitor and assess patient's condition, use behavioral interventions as indicated, provide education, and offer reassurance and support.  Goals on the unit include: medication compliance, ADL adherence, participation in groups, development of coping skills, and stabilization for discharge.    Orders Placed This Encounter      Withdrawal precautions

## 2020-09-18 NOTE — ED PROVIDER NOTES
ED Provider Note  [facility]      History     Chief Complaint   Patient presents with     Alcohol Intoxication     Suicidal     possible SI, pt unable to track, but family states multiple statements about SI     HPI  Adri Delarosa is a 39 year old female who presents to the emergency department seeking detox from alcohol.  The patient states that she has been drinking heavily for the past year.  She states that she has been drinking 1 L/day over the past several days.  She becomes tremulous when she does not drink.  She denies a history of alcohol withdrawal seizures or DTs.  Her last drink was just before coming to the emergency department this evening.  The patient denies any problems with depression or suicidal ideations.  Apparently, the family told the triage nurse that she has been making statements about suicide today.  Patient denies any recent fall or injury.  She denies any recent illness or medical concerns including fever, cough, shortness of breath, abdominal pain, vomiting, and diarrhea.    Past Medical History  No past medical history on file.  No past surgical history on file.  hydrOXYzine (ATARAX) 25 MG tablet      No Known Allergies  Family History  No family history on file.  Social History   Social History     Tobacco Use     Smoking status: Not on file   Substance Use Topics     Alcohol use: Not on file     Drug use: Not on file      Past medical history, past surgical history, medications, allergies, family history, and social history were reviewed with the patient. No additional pertinent items.       Review of Systems   Constitutional: Negative for fever.   HENT: Negative for congestion.    Eyes: Negative for redness.   Respiratory: Negative for shortness of breath.    Cardiovascular: Negative for chest pain.   Gastrointestinal: Negative for abdominal pain.   Genitourinary: Negative for difficulty urinating.   Musculoskeletal: Negative for arthralgias and neck  stiffness.   Skin: Negative for color change.   Neurological: Negative for headaches.   Psychiatric/Behavioral: Negative for confusion.   All other systems reviewed and are negative.    A complete review of systems was performed with pertinent positives and negatives noted in the HPI, and all other systems negative.    Physical Exam   BP: 129/85  Pulse: 94  Temp: 98.5  F (36.9  C)  Resp: 12  SpO2: 97 %  Physical Exam  Vitals signs and nursing note reviewed.   Constitutional:       General: She is not in acute distress.     Appearance: She is not diaphoretic.   HENT:      Head: Atraumatic.      Mouth/Throat:      Pharynx: No oropharyngeal exudate.   Eyes:      General: No scleral icterus.     Pupils: Pupils are equal, round, and reactive to light.   Cardiovascular:      Heart sounds: Normal heart sounds.   Pulmonary:      Effort: No respiratory distress.      Breath sounds: Normal breath sounds.   Abdominal:      General: Bowel sounds are normal.      Palpations: Abdomen is soft.      Tenderness: There is no abdominal tenderness.   Musculoskeletal:         General: No tenderness.   Skin:     General: Skin is warm.      Findings: No rash.   Neurological:      General: No focal deficit present.   Psychiatric:         Speech: Speech is slurred.         Thought Content: Thought content does not include suicidal ideation.         ED Course      Procedures        Results for orders placed or performed during the hospital encounter of 09/17/20   HCG qualitative urine     Status: None   Result Value Ref Range    HCG Qual Urine Negative NEG^Negative   Drug abuse screen 6 urine (chem dep)     Status: Abnormal   Result Value Ref Range    Amphetamine Qual Urine Negative NEG^Negative    Barbiturates Qual Urine Negative NEG^Negative    Benzodiazepine Qual Urine Negative NEG^Negative    Cannabinoids Qual Urine Negative NEG^Negative    Cocaine Qual Urine Negative NEG^Negative    Ethanol Qual Urine Positive (A) NEG^Negative     Opiates Qualitative Urine Negative NEG^Negative   Comprehensive metabolic panel     Status: Abnormal   Result Value Ref Range    Sodium 142 133 - 144 mmol/L    Potassium 3.8 3.4 - 5.3 mmol/L    Chloride 108 94 - 109 mmol/L    Carbon Dioxide 25 20 - 32 mmol/L    Anion Gap 9 3 - 14 mmol/L    Glucose 127 (H) 70 - 99 mg/dL    Urea Nitrogen 15 7 - 30 mg/dL    Creatinine 0.69 0.52 - 1.04 mg/dL    GFR Estimate >90 >60 mL/min/[1.73_m2]    GFR Estimate If Black >90 >60 mL/min/[1.73_m2]    Calcium 8.1 (L) 8.5 - 10.1 mg/dL    Bilirubin Total 0.2 0.2 - 1.3 mg/dL    Albumin 3.6 3.4 - 5.0 g/dL    Protein Total 7.2 6.8 - 8.8 g/dL    Alkaline Phosphatase 49 40 - 150 U/L    ALT 25 0 - 50 U/L    AST 27 0 - 45 U/L   CBC with platelets differential     Status: Abnormal   Result Value Ref Range    WBC 11.8 (H) 4.0 - 11.0 10e9/L    RBC Count 4.33 3.8 - 5.2 10e12/L    Hemoglobin 15.1 11.7 - 15.7 g/dL    Hematocrit 42.7 35.0 - 47.0 %    MCV 99 78 - 100 fl    MCH 34.9 (H) 26.5 - 33.0 pg    MCHC 35.4 31.5 - 36.5 g/dL    RDW 11.1 10.0 - 15.0 %    Platelet Count 321 150 - 450 10e9/L    Diff Method Automated Method     % Neutrophils 72.2 %    % Lymphocytes 24.2 %    % Monocytes 2.5 %    % Eosinophils 0.8 %    % Basophils 0.2 %    % Immature Granulocytes 0.1 %    Nucleated RBCs 0 0 /100    Absolute Neutrophil 8.5 (H) 1.6 - 8.3 10e9/L    Absolute Lymphocytes 2.9 0.8 - 5.3 10e9/L    Absolute Monocytes 0.3 0.0 - 1.3 10e9/L    Absolute Eosinophils 0.1 0.0 - 0.7 10e9/L    Absolute Basophils 0.0 0.0 - 0.2 10e9/L    Abs Immature Granulocytes 0.0 0 - 0.4 10e9/L    Absolute Nucleated RBC 0.0    Magnesium     Status: Abnormal   Result Value Ref Range    Magnesium 2.4 (H) 1.6 - 2.3 mg/dL   UA with Microscopic     Status: Abnormal   Result Value Ref Range    Color Urine Light Yellow     Appearance Urine Slightly Cloudy     Glucose Urine Negative NEG^Negative mg/dL    Bilirubin Urine Negative NEG^Negative    Ketones Urine Negative NEG^Negative mg/dL     Specific Gravity Urine 1.006 1.003 - 1.035    Blood Urine Trace (A) NEG^Negative    pH Urine 6.5 5.0 - 7.0 pH    Protein Albumin Urine Negative NEG^Negative mg/dL    Urobilinogen mg/dL Normal 0.0 - 2.0 mg/dL    Nitrite Urine Negative NEG^Negative    Leukocyte Esterase Urine Negative NEG^Negative    Source Urine     WBC Urine 1 0 - 5 /HPF    RBC Urine 0 0 - 2 /HPF    Bacteria Urine Few (A) NEG^Negative /HPF    Squamous Epithelial /HPF Urine 27 (H) 0 - 1 /HPF    Mucous Urine Present (A) NEG^Negative /LPF   Alcohol breath test POCT     Status: Abnormal   Result Value Ref Range    Alcohol Breath Test 0.246 (A) 0.00 - 0.01            Medications   0.9% sodium chloride BOLUS (1,000 mLs Intravenous New Bag 9/17/20 4650)        Assessments & Plan (with Medical Decision Making)   39 year old female to the emergency department with alcohol abuse and dependence.  She is seeking detox.  Patient does not have any mental health concerns.  She is not suicidal.  She does not have any medical concerns.  Her labs are essentially unrevealing.  She does have a mild leukocytosis but no fever, cough, UTI symptoms, or other symptoms concerning for infection.  Patient appears medically stable for detox admission.    I have reviewed the nursing notes. I have reviewed the findings, diagnosis, plan and need for follow up with the patient.    New Prescriptions    No medications on file       Final diagnoses:   Alcohol dependence with uncomplicated intoxication (H)       --  Wil Mixon Md  [facility], [place], EMERGENCY DEPARTMENT  9/17/2020     Wil Mixon MD  09/17/20 7158       Wil Mixon MD  09/18/20 0102

## 2020-09-18 NOTE — ED NOTES
Pt states that she wants to leave. Family member in room is telling patient that she will not take her home. Pt is aware that she is on a SEGUNDO

## 2020-09-18 NOTE — PROGRESS NOTES
09/18/20 0536   Patient Belongings   Did you bring any home meds/supplements to the hospital?  Yes   Disposition of meds  Sent to security/pharmacy per site process   Patient Belongings locker   Patient Belongings Put in Hospital Secure Location (Security or Locker, etc.) cash/credit card;cell phone/electronics;clothing;keys;money (see comment);tote;purse/wallet   Belongings Search Yes   Clothing Search Yes   Second Staff Araceli RUBY and Garima DIAZ   Comment All of patient's belongings are in a bin and her discharge bin. Her important cards, checkbook and cash of $3.00 are in a security envelop in her discharge bin.     BIN:  Yoga pants, 2 shirts, a purse with personal stuff and a tote with clothing and personal stuff.    DISCHARGE BIN:  Phone,  and keys    SECURITY ENV # [id number] - [place] Drivers License, 5 [employer] Card, 3 VISA, Check Book and cash of $3.00    A               Admission:  I am responsible for any personal items that are not sent to the safe or pharmacy.  [facility] is not responsible for loss, theft or damage of any property in my possession.    Signature:  _________________________________ Date: _______  Time: _____                                              Staff Signature:  ____________________________ Date: ________  Time: _____      2nd Staff person, if patient is unable/unwilling to sign:    Signature: ________________________________ Date: ________  Time: _____     Discharge:  [facility] has returned all of my personal belongings:    Signature: _________________________________ Date: ________  Time: _____                                          Staff Signature:  ____________________________ Date: ________  Time: _____

## 2020-09-19 VITALS
DIASTOLIC BLOOD PRESSURE: 88 MMHG | SYSTOLIC BLOOD PRESSURE: 129 MMHG | RESPIRATION RATE: 16 BRPM | OXYGEN SATURATION: 98 % | BODY MASS INDEX: 20.77 KG/M2 | TEMPERATURE: 97.6 F | WEIGHT: 110 LBS | HEART RATE: 92 BPM | HEIGHT: 61 IN

## 2020-09-19 LAB
CHOLEST SERPL-MCNC: 210 MG/DL
HDLC SERPL-MCNC: 84 MG/DL
LDLC SERPL CALC-MCNC: 105 MG/DL
NONHDLC SERPL-MCNC: 126 MG/DL
TRIGL SERPL-MCNC: 103 MG/DL

## 2020-09-19 PROCEDURE — 80061 LIPID PANEL: CPT | Performed by: NURSE PRACTITIONER

## 2020-09-19 PROCEDURE — 99207 ZZC NON-BILLABLE SERV PER CHARTING: CPT | Performed by: PSYCHIATRY & NEUROLOGY

## 2020-09-19 PROCEDURE — 36415 COLL VENOUS BLD VENIPUNCTURE: CPT | Performed by: NURSE PRACTITIONER

## 2020-09-19 RX ORDER — NALTREXONE HYDROCHLORIDE 50 MG/1
50 TABLET, FILM COATED ORAL DAILY
Qty: 90 TABLET | Refills: 1 | Status: ON HOLD | OUTPATIENT
Start: 2020-09-19 | End: 2021-10-09

## 2020-09-19 RX ORDER — FOLIC ACID 1 MG/1
1 TABLET ORAL DAILY
Qty: 90 TABLET | Refills: 1 | Status: ON HOLD | OUTPATIENT
Start: 2020-09-20 | End: 2021-10-09

## 2020-09-19 RX ORDER — MULTIPLE VITAMINS W/ MINERALS TAB 9MG-400MCG
1 TAB ORAL DAILY
Qty: 90 EACH | Refills: 1 | Status: ON HOLD | OUTPATIENT
Start: 2020-09-20 | End: 2021-10-09

## 2020-09-19 RX ORDER — LANOLIN ALCOHOL/MO/W.PET/CERES
100 CREAM (GRAM) TOPICAL DAILY
Qty: 90 TABLET | Refills: 1 | Status: ON HOLD | OUTPATIENT
Start: 2020-09-20 | End: 2022-02-12

## 2020-09-19 ASSESSMENT — ACTIVITIES OF DAILY LIVING (ADL)
LAUNDRY: WITH SUPERVISION
ORAL_HYGIENE: INDEPENDENT
HYGIENE/GROOMING: INDEPENDENT
DRESS: INDEPENDENT

## 2020-09-19 NOTE — PROGRESS NOTES
Patient discharged at 1200 to home after meeting with on call, MD, Dr. Roa. Patient plans to take an Uber home. Patient stated she will follow-up with [facility] at home. All patient belongings from room, locker, and security sent with patient. Patient escorted off the unit by Psych Associate, Kralos.     This RN went over discharge instructions, teachings, patient's labs, and unit recommendations with patient. This RN went over patient's discharge medications that are being sent directly to patient's home pharmacy. Patient verbalizes and demonstrates understanding of all teachings. Patient verbalizes understanding of medication instructions and indications. Patient denies thoughts of harm towards self or others. Patient denies access to guns. Patient denies auditory/visual hallucinations. Pt denies any current medication side effects or medical issues at this time. Prior to discharge, patient was irritable and labile. Patient does report feeling hopeful for the future.     Patient denies any further questions and is now discharged at this time.

## 2020-09-19 NOTE — DISCHARGE INSTRUCTIONS
Behavioral Discharge Planning and Instructions  THANK YOU FOR CHOOSING THE [facility]  3A  [phone]    Summary: You were admitted to Station 3A on 9/18/20 for detoxification from alcohol.  A medical exam was performed that included lab work. You have met with a  and opted to pursue treatment at [facility].  Please take care and make your recovery a priority, Adri!    Recommendation:  Complete [facility] and follow all recommendations    Main Diagnosis: Per Dr. Pia Chen MD;  1. Alcohol Use Disorder, Severe.     Major Treatments, Procedures and Findings:  You were detoxed from alcohol with the Modified Selective Severity Protocol using the medication, Valium. You have met with a  to develop a treatment plan for discharge.  You have had labs drawn and a copy of those labs will be sent home with you.  Please bring your lab results with to your follow up doctor appointment.    Symptoms to Report:  If you experience more anxiety, confusion, sleeplessness, deep sadness or thoughts of suicide, notify your treatment team or notify your primary care physician. IF ANY OF THE SYMPTOMS YOU ARE EXPERIENCING ARE A MEDICAL EMERGENCY CALL 911 IMMEDIATELY.     Lifestyle Adjustment: Adjust your lifestyle to get enough sleep, relaxation, exercise and  good nutrition. Continue to develop healthy coping skills to decrease stress and promote a sober living environment. Do not use alcohol, illegal drugs or addictive medications other than what is currently prescribed. AA, NA, and  Sponsor are excellent resources for support.     Primary Provider:  ~ Please make a follow-up with Primary Care Provider within one week of discharge ~   *Please take a copy of your labs with you.     Disposition: Home/[facility]    Facts about COVID19 at www.cdc.gov/COVID19 and www.[place].gov/covid19    Keeping hands clean is one of the most important steps we can take to avoid getting sick and  "spreading germs to others.  Please wash your hands frequently and lather with soap for at least 20 seconds!      Resources:   Resources for on line recovery meetings:    *due to covid-19 AA/NA meetings are being held online*    AA meetings can be found online; search for them at: http://aa-intergroup.org/directory.php  AA meetings via [place] for [place] area can be found online at: https://a[place].org/find-a-meeting/holiday-closings/  NA meetings via [place] for [place] area can be found online at: https://sites.[facility].com/view/[place]regionofnarcoticsanonymous/home?authuser=2    Www.CYBRA  has online resources for meeting and recovery care including Podcast \"Let's Talk:Addiction & Recovery Podcasts    Www.mnrecFocus Mediay.org     DISCHARGE RESOURCES:  -SMART Recovery - self management for addiction recovery:  www.smartrecFocus Mediay.org    -Pathways ~ A Health Crisis Resource & Support Center: 618.344.1229.  -[facility] [phone]   -[facility] Behavioral Intake 893-084-3699 or 437-839-7055.  -Suicide Awareness Voices of Education (SAVE) (www.save.org): 065-630-SHUD (4665)  -National Suicide Prevention Line (www.mentalhealthmn.org): 566-728-TJXL (9927)  -National [place] on Mental Illness (www.[place].niecy.org): 122.647.8546 or 480-402-8296.  -Znbk6hanu: text the word LIFE to 80112 for immediate support and crisis intervention  -Mental Health Consumer/Survivor Network of [place] (www.mhcsn.net): 905.623.1988 or 939-579-7371  -Mental Health Association of [place] (www.mentalhealth.org): 695.642.3858 or 538-230-4571     -Substance Abuse and Mental Health Services (www.samhsa.gov)  -Harm Reduction Coalition (www. Harmreduction.org)  -www.prescribetoprevent.org or http://prescribetoprevent.org/video  -Poison control 2-753-167-8082   **[place] Opioid Prevention Coalition: www.opioidcoalition.org    Sober Support Group Information:  AA/MAGDIEL & Sponsor/Support  -Alcoholics Anonymous (www.alcoholics-anonymous.org): " for local information 24 hours/day  -AA Intergroup service office in [place] (http://www.aastpaul.org/) 846.574.1674  -AA Intergroup service office in [facility]: 323.977.6992. (http://www.a[place].org/)  -Narcotics Anonymous (www.nam[place].org) (858) 463-3424   **Sober Fun Activities: www.soberTransmit Promoactivities.Powervation/[facility]//[facility] Recovery Connection ([facility])  [facility] connects people seeking recovery to resources that help foster and sustain long-term recovery.  Whether you are seeking resources for treatment, transportation, housing, job training, education, health care or other pathways to recovery, [facility] is a great place to start.    Phone: 158.410.4509.  www.[place]Instablogs (Great listing of all types of recovery and non-recovery related resources)      General Medication Instructions:   See your medication sheet(s) for instructions.   Take all medicines as directed.  Make no changes unless your doctor suggests them.   Go to all your doctor visits.  Be sure to have all your required lab tests. This way, your medicines can be refilled on time.  Do not use any drugs not prescribed by your provider.  AA/NA and Sponsors are excellent resources for support  Avoid alcohol.    Any follow up concerns:  Nursing questions call the Unit -[facility] [phone]  Medical Record call [phone]  Outpatient Behavioral Intake call [phone]  LP+ Wait List/Bed Availability call [phone]    The entire treatment team has appreciated the opportunity to work with kari Rush.  We wish you the best in the future and with your lifelong recovery goals. Please bring this discharge folder with you to all follow up appointments.  It contains your lab results, diagnosis, medication list and discharge recommendations.    THANK YOU FOR CHOOSING THE [facility]

## 2020-09-19 NOTE — PROGRESS NOTES
S: Patient scored less than 8 for greater than 24 hours. Pt has required no medication for withdrawal for > 24 hours,.  B: Patient admitted for alcohol withdrawal and detoxification.  A: Patient stable in the alcohol withdrawal process AEB MSSA scores < 8 for > 24 hours.  R: Patient removed from detox status per unit Protocol.    Patient is up at the  frequently, asking when she can be discharged. This RN reiterated that patient needs to be seen by psychiatry first.

## 2020-09-19 NOTE — DISCHARGE SUMMARY
"Admitted:     09/17/2020      IDENTIFYING INFORMATION:  The patient is a 39-year-old  female.  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      CHIEF COMPLAINT:  \"Got drunk.\"       HISTORY OF PRESENT ILLNESS:  The patient has been drinking since age of 14.  At 28, it became more of a problem.  She relapsed after 2-1/2 years of sobriety and was binge drinking.  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  She has tolerance to alcohol, withdrawal, progressive use, loss of control, and tried to quit unsuccessfully, despite negative consequences such as money.  She has no history of DTS and seizures.  She does not use any drugs.  Smokes 1-2 cigarettes a day.  She does have chronic depression.  She says she is not depressed right now.  She does not have any suicidal ideation, plan or intent.  She says she has her kids.  She has no access to a gun.  She did make 1 prior suicide attempt 2 months ago but gets very irritable when pressed for details about it.  When she gets depressed, her sleep goes down.  Her energy goes down.  Her motivation goes down, and her appetite goes up.  She does not have any auditory or visual hallucinations.  She denies any yusra.  She denies any PTSD.  She denies any psychosis.  She denies any OCD.        PAST PSYCHIATRIC HISTORY:  She was psychiatrically hospitalized once.  She was in treatment at [facility].      MEDICAL HISTORY:  The patient's vitals are as below:        VITAL SIGNS:  Blood pressure 133/76;, pulse is 85; respiratory rate is 16; temperature is 98.        ALLERGIES:  SHE HAS NO KNOWN ALLERGIES.      FAMILY HISTORY:  Denies any alcohol.  No chemical dependency issues in her family.  No psychiatric issues in the family.      SOCIAL HISTORY:  She was born in [place].  She grew up in [place].  She is single, has 3 children.      MENTAL STATUS EXAMINATION:  The patient is a 39-year-old  female lying in bed.  She " has poor grooming, poor hygiene, and poor eye contact.  Mood is depressed.  Affect is congruent.  Speech is spontaneous, normal in rate, rhythm and volume.  Psychomotor retardation is seen.  Linear thought process, no loosening of association.  Does not have any active suicidal ideation, plan or intent.  Insight and judgment are limited.  Alert and oriented times 3.  Attention, concentration is intact.  Recent and remote memory intact.  Language and fund of knowledge intact.  Normal muscle strength, normal gait.      DIAGNOSES:   Axis I:   1.  Alcohol use disorder, severe.   2.  Alcohol withdrawal, severe.   3.  Major depressive disorder, moderate, recurrent, without psychotic features.      PLAN:  The patient will be detoxed off alcohol using MSSA protocol on Valium.  The patient has tremor, sleep disturbance, agitation, and paroxysmal sweats.  Her pulse is 85, blood pressure 123/76.  She has scored 9 and 9 on the MSSA and has required 20 mg of Valium.  The patient had lab work done, which are basically essentially normal.  Her folate is low at 5.4, but she is getting folate replacement right now.  She will be seen by Medicine for it.  She has stopped taking Prozac for her depression.  Initially, she will started back on Prozac 20 mg.      I did medication reconciliation and provided psychoeducation.  I have reviewed labs with the patient and talked about this with the patient.  I have reviewed and summarized old records including medication, reconciliation of home medications and PDMP.  I have spoken with the nurse about medications and withdrawal scores.  I have spoken with the  about her treatment options.         EMILIE BLUE MD    Hospital course: she completed detox and was discharged to stay with family pending the [place].  Labs and plan sere reviewed.  Naltrexone was re-prescribed.  She is not suicidal.      Current Facility-Administered Medications:      acetaminophen (TYLENOL) tablet  650 mg, 650 mg, Oral, Q4H PRN, Rm Ricketts APRN CNP     alum & mag hydroxide-simethicone (MAALOX  ES) suspension 30 mL, 30 mL, Oral, Q4H PRN, Rm Ricketts APRN CNP     atenolol (TENORMIN) tablet 50 mg, 50 mg, Oral, Daily PRN, Rm Ricketts APRN CNP     bisacodyl (DULCOLAX) Suppository 10 mg, 10 mg, Rectal, Daily PRN, Rm Ricketts APRN CNP     diazepam (VALIUM) tablet 5-20 mg, 5-20 mg, Oral, Q30 Min PRN, Rm Ricketts APRN CNP, 10 mg at 09/18/20 0932     FLUoxetine (PROzac) capsule 20 mg, 20 mg, Oral, Daily, Pia Chen MD     folic acid (FOLVITE) tablet 1 mg, 1 mg, Oral, Daily, Rm Ricketts APRN CNP, 1 mg at 09/18/20 0932     hydrOXYzine (ATARAX) tablet 25 mg, 25 mg, Oral, TID PRN, Rm Ricketts APRN CNP     hydrOXYzine (ATARAX) tablet 25 mg, 25 mg, Oral, Q4H PRN, Rm Ricketts APRN CNP, 25 mg at 09/18/20 2008     magnesium hydroxide (MILK OF MAGNESIA) suspension 30 mL, 30 mL, Oral, At Bedtime PRN, Rm Ricketts APRN CNP     multivitamin w/minerals (THERA-VIT-M) tablet 1 tablet, 1 tablet, Oral, Daily, Rm Ricketts APRN CNP, 1 tablet at 09/18/20 0932     thiamine (B-1) tablet 100 mg, 100 mg, Oral, Daily, Rm Ricketts APRN CNP, 100 mg at 09/18/20 0932     traZODone (DESYREL) tablet 50 mg, 50 mg, Oral, At Bedtime PRN, Liliam, Rm Rose, APRN CNP, 50 mg at 09/18/20 2008  Recent Results (from the past 168 hour(s))   Alcohol breath test POCT    Collection Time: 09/17/20  9:37 PM   Result Value Ref Range    Alcohol Breath Test 0.246 (A) 0.00 - 0.01   HCG qualitative urine    Collection Time: 09/17/20 10:17 PM   Result Value Ref Range    HCG Qual Urine Negative NEG^Negative   Drug abuse screen 6 urine (chem dep)    Collection Time: 09/17/20 10:17 PM   Result Value Ref Range    Amphetamine Qual Urine Negative NEG^Negative     Barbiturates Qual Urine Negative NEG^Negative    Benzodiazepine Qual Urine Negative NEG^Negative    Cannabinoids Qual Urine Negative NEG^Negative    Cocaine Qual Urine Negative NEG^Negative    Ethanol Qual Urine Positive (A) NEG^Negative    Opiates Qualitative Urine Negative NEG^Negative   UA with Microscopic    Collection Time: 09/17/20 10:17 PM   Result Value Ref Range    Color Urine Light Yellow     Appearance Urine Slightly Cloudy     Glucose Urine Negative NEG^Negative mg/dL    Bilirubin Urine Negative NEG^Negative    Ketones Urine Negative NEG^Negative mg/dL    Specific Gravity Urine 1.006 1.003 - 1.035    Blood Urine Trace (A) NEG^Negative    pH Urine 6.5 5.0 - 7.0 pH    Protein Albumin Urine Negative NEG^Negative mg/dL    Urobilinogen mg/dL Normal 0.0 - 2.0 mg/dL    Nitrite Urine Negative NEG^Negative    Leukocyte Esterase Urine Negative NEG^Negative    Source Urine     WBC Urine 1 0 - 5 /HPF    RBC Urine 0 0 - 2 /HPF    Bacteria Urine Few (A) NEG^Negative /HPF    Squamous Epithelial /HPF Urine 27 (H) 0 - 1 /HPF    Mucous Urine Present (A) NEG^Negative /LPF   Comprehensive metabolic panel    Collection Time: 09/17/20 10:25 PM   Result Value Ref Range    Sodium 142 133 - 144 mmol/L    Potassium 3.8 3.4 - 5.3 mmol/L    Chloride 108 94 - 109 mmol/L    Carbon Dioxide 25 20 - 32 mmol/L    Anion Gap 9 3 - 14 mmol/L    Glucose 127 (H) 70 - 99 mg/dL    Urea Nitrogen 15 7 - 30 mg/dL    Creatinine 0.69 0.52 - 1.04 mg/dL    GFR Estimate >90 >60 mL/min/[1.73_m2]    GFR Estimate If Black >90 >60 mL/min/[1.73_m2]    Calcium 8.1 (L) 8.5 - 10.1 mg/dL    Bilirubin Total 0.2 0.2 - 1.3 mg/dL    Albumin 3.6 3.4 - 5.0 g/dL    Protein Total 7.2 6.8 - 8.8 g/dL    Alkaline Phosphatase 49 40 - 150 U/L    ALT 25 0 - 50 U/L    AST 27 0 - 45 U/L   CBC with platelets differential    Collection Time: 09/17/20 10:25 PM   Result Value Ref Range    WBC 11.8 (H) 4.0 - 11.0 10e9/L    RBC Count 4.33 3.8 - 5.2 10e12/L    Hemoglobin 15.1 11.7 -  15.7 g/dL    Hematocrit 42.7 35.0 - 47.0 %    MCV 99 78 - 100 fl    MCH 34.9 (H) 26.5 - 33.0 pg    MCHC 35.4 31.5 - 36.5 g/dL    RDW 11.1 10.0 - 15.0 %    Platelet Count 321 150 - 450 10e9/L    Diff Method Automated Method     % Neutrophils 72.2 %    % Lymphocytes 24.2 %    % Monocytes 2.5 %    % Eosinophils 0.8 %    % Basophils 0.2 %    % Immature Granulocytes 0.1 %    Nucleated RBCs 0 0 /100    Absolute Neutrophil 8.5 (H) 1.6 - 8.3 10e9/L    Absolute Lymphocytes 2.9 0.8 - 5.3 10e9/L    Absolute Monocytes 0.3 0.0 - 1.3 10e9/L    Absolute Eosinophils 0.1 0.0 - 0.7 10e9/L    Absolute Basophils 0.0 0.0 - 0.2 10e9/L    Abs Immature Granulocytes 0.0 0 - 0.4 10e9/L    Absolute Nucleated RBC 0.0    Magnesium    Collection Time: 09/17/20 10:25 PM   Result Value Ref Range    Magnesium 2.4 (H) 1.6 - 2.3 mg/dL   GGT    Collection Time: 09/17/20 10:25 PM   Result Value Ref Range    GGT 22 0 - 40 U/L   TSH with free T4 reflex    Collection Time: 09/17/20 10:25 PM   Result Value Ref Range    TSH 1.14 0.40 - 4.00 mU/L   Folate    Collection Time: 09/18/20  6:52 AM   Result Value Ref Range    Folate 5.1 (L) >5.4 ng/mL   Vitamin B12    Collection Time: 09/18/20  6:52 AM   Result Value Ref Range    Vitamin B12 342 193 - 986 pg/mL   Asymptomatic COVID-19 Virus (Coronavirus) by PCR    Collection Time: 09/18/20 10:58 AM    Specimen: Nasopharyngeal   Result Value Ref Range    COVID-19 Virus PCR to [facility] - Source Nasopharyngeal     COVID-19 Virus PCR to [facility] - Result       Test received-See reflex to IDDL test SARS CoV2 (COVID-19) Virus RT-PCR   SARS-CoV-2 COVID-19 Virus (Coronavirus) RT-PCR Nasopharyngeal    Collection Time: 09/18/20 10:58 AM    Specimen: Nasopharyngeal   Result Value Ref Range    SARS-CoV-2 Virus Specimen Source Nasopharyngeal     SARS-CoV-2 PCR Result NEGATIVE     SARS-CoV-2 PCR Comment       Testing was performed using the Simplexa COVID-19 Direct Assay on the [employer] MDX   instrument. Additional  information about this Emergency Use Authorization (EUA) assay can   be found via the Lab Guide.     Lipid panel    Collection Time: 09/19/20  6:51 AM   Result Value Ref Range    Cholesterol 210 (H) <200 mg/dL    Triglycerides 103 <150 mg/dL    HDL Cholesterol 84 >49 mg/dL    LDL Cholesterol Calculated 105 (H) <100 mg/dL    Non HDL Cholesterol 126 <130 mg/dL   as noted above and redundantly re-stated here, her diagnoses are Alcohol use disorder, severe, alcohol withdrawal severe, and MDD    Video-Visit Details    Type of service:  Video Visit    Video Start Time (time video started): 1130    Video End Time (time video stopped): 1140    Originating Location (pt. Location): [facility]    Distant Location (provider location): Provider remote location    Mode of Communication:  Video Conference via [employer]    Physician has received verbal consent for a Video Visit from the patient? Yes      Trevor Roa MD

## 2020-09-19 NOTE — PROGRESS NOTES
"Patient is requesting discharge. Patient declined all morning medications and stated, \"I don't want to be charged money for that.\"   "

## 2021-10-08 ENCOUNTER — HOSPITAL ENCOUNTER (INPATIENT)
Facility: CLINIC | Age: 41
LOS: 2 days | Discharge: HOME | End: 2021-10-10
Attending: EMERGENCY MEDICINE | Admitting: PSYCHIATRY & NEUROLOGY
Payer: COMMERCIAL

## 2021-10-08 ENCOUNTER — TELEPHONE (OUTPATIENT)
Dept: BEHAVIORAL HEALTH | Facility: CLINIC | Age: 41
End: 2021-10-08

## 2021-10-08 DIAGNOSIS — Z20.822 CONTACT WITH AND (SUSPECTED) EXPOSURE TO COVID-19: ICD-10-CM

## 2021-10-08 DIAGNOSIS — F32.1 CURRENT MODERATE EPISODE OF MAJOR DEPRESSIVE DISORDER, UNSPECIFIED WHETHER RECURRENT (H): Primary | ICD-10-CM

## 2021-10-08 DIAGNOSIS — F10.930 ALCOHOL WITHDRAWAL SYNDROME WITHOUT COMPLICATION (H): ICD-10-CM

## 2021-10-08 DIAGNOSIS — F10.939 ALCOHOL WITHDRAWAL SYNDROME WITH COMPLICATION, WITH UNSPECIFIED COMPLICATION (H): ICD-10-CM

## 2021-10-08 LAB
ALBUMIN SERPL-MCNC: 3.6 G/DL (ref 3.4–5)
ALCOHOL BREATH TEST: 0.21 (ref 0–0.01)
ALP SERPL-CCNC: 48 U/L (ref 40–150)
ALT SERPL W P-5'-P-CCNC: 46 U/L (ref 0–50)
AMPHETAMINES UR QL SCN: NORMAL
ANION GAP SERPL CALCULATED.3IONS-SCNC: 11 MMOL/L (ref 3–14)
AST SERPL W P-5'-P-CCNC: 74 U/L (ref 0–45)
BARBITURATES UR QL: NORMAL
BASOPHILS # BLD AUTO: 0.1 10E3/UL (ref 0–0.2)
BASOPHILS NFR BLD AUTO: 1 %
BENZODIAZ UR QL: NORMAL
BILIRUB SERPL-MCNC: 0.5 MG/DL (ref 0.2–1.3)
BUN SERPL-MCNC: 10 MG/DL (ref 7–30)
CALCIUM SERPL-MCNC: 8.4 MG/DL (ref 8.5–10.1)
CANNABINOIDS UR QL SCN: NORMAL
CHLORIDE BLD-SCNC: 106 MMOL/L (ref 94–109)
CO2 SERPL-SCNC: 22 MMOL/L (ref 20–32)
COCAINE UR QL: NORMAL
CREAT SERPL-MCNC: 0.74 MG/DL (ref 0.52–1.04)
EOSINOPHIL # BLD AUTO: 0.1 10E3/UL (ref 0–0.7)
EOSINOPHIL NFR BLD AUTO: 1 %
ERYTHROCYTE [DISTWIDTH] IN BLOOD BY AUTOMATED COUNT: 11.8 % (ref 10–15)
GFR SERPL CREATININE-BSD FRML MDRD: >90 ML/MIN/1.73M2
GLUCOSE BLD-MCNC: 88 MG/DL (ref 70–99)
HCG UR QL: NEGATIVE
HCT VFR BLD AUTO: 40.3 % (ref 35–47)
HGB BLD-MCNC: 14.3 G/DL (ref 11.7–15.7)
IMM GRANULOCYTES # BLD: 0 10E3/UL
IMM GRANULOCYTES NFR BLD: 0 %
LYMPHOCYTES # BLD AUTO: 3.1 10E3/UL (ref 0.8–5.3)
LYMPHOCYTES NFR BLD AUTO: 39 %
MCH RBC QN AUTO: 34.5 PG (ref 26.5–33)
MCHC RBC AUTO-ENTMCNC: 35.5 G/DL (ref 31.5–36.5)
MCV RBC AUTO: 97 FL (ref 78–100)
MONOCYTES # BLD AUTO: 0.7 10E3/UL (ref 0–1.3)
MONOCYTES NFR BLD AUTO: 9 %
NEUTROPHILS # BLD AUTO: 4 10E3/UL (ref 1.6–8.3)
NEUTROPHILS NFR BLD AUTO: 50 %
NRBC # BLD AUTO: 0 10E3/UL
NRBC BLD AUTO-RTO: 0 /100
OPIATES UR QL SCN: NORMAL
PLATELET # BLD AUTO: 274 10E3/UL (ref 150–450)
POTASSIUM BLD-SCNC: 3.6 MMOL/L (ref 3.4–5.3)
PROT SERPL-MCNC: 7.1 G/DL (ref 6.8–8.8)
RBC # BLD AUTO: 4.14 10E6/UL (ref 3.8–5.2)
SARS-COV-2 RNA RESP QL NAA+PROBE: NEGATIVE
SODIUM SERPL-SCNC: 139 MMOL/L (ref 133–144)
WBC # BLD AUTO: 7.9 10E3/UL (ref 4–11)

## 2021-10-08 PROCEDURE — U0003 INFECTIOUS AGENT DETECTION BY NUCLEIC ACID (DNA OR RNA); SEVERE ACUTE RESPIRATORY SYNDROME CORONAVIRUS 2 (SARS-COV-2) (CORONAVIRUS DISEASE [COVID-19]), AMPLIFIED PROBE TECHNIQUE, MAKING USE OF HIGH THROUGHPUT TECHNOLOGIES AS DESCRIBED BY CMS-2020-01-R: HCPCS | Performed by: EMERGENCY MEDICINE

## 2021-10-08 PROCEDURE — 81025 URINE PREGNANCY TEST: CPT | Performed by: EMERGENCY MEDICINE

## 2021-10-08 PROCEDURE — 82040 ASSAY OF SERUM ALBUMIN: CPT | Performed by: EMERGENCY MEDICINE

## 2021-10-08 PROCEDURE — 128N000004 HC R&B CD ADULT

## 2021-10-08 PROCEDURE — 250N000013 HC RX MED GY IP 250 OP 250 PS 637: Performed by: PSYCHIATRY & NEUROLOGY

## 2021-10-08 PROCEDURE — 36415 COLL VENOUS BLD VENIPUNCTURE: CPT | Performed by: EMERGENCY MEDICINE

## 2021-10-08 PROCEDURE — C9803 HOPD COVID-19 SPEC COLLECT: HCPCS | Performed by: EMERGENCY MEDICINE

## 2021-10-08 PROCEDURE — 85025 COMPLETE CBC W/AUTO DIFF WBC: CPT | Performed by: EMERGENCY MEDICINE

## 2021-10-08 PROCEDURE — 99285 EMERGENCY DEPT VISIT HI MDM: CPT | Performed by: EMERGENCY MEDICINE

## 2021-10-08 PROCEDURE — 82075 ASSAY OF BREATH ETHANOL: CPT | Performed by: EMERGENCY MEDICINE

## 2021-10-08 PROCEDURE — 80307 DRUG TEST PRSMV CHEM ANLYZR: CPT | Performed by: EMERGENCY MEDICINE

## 2021-10-08 RX ORDER — ATENOLOL 50 MG/1
50 TABLET ORAL DAILY PRN
Status: DISCONTINUED | OUTPATIENT
Start: 2021-10-08 | End: 2021-10-10

## 2021-10-08 RX ORDER — GABAPENTIN 100 MG/1
100 CAPSULE ORAL 3 TIMES DAILY PRN
Status: DISCONTINUED | OUTPATIENT
Start: 2021-10-08 | End: 2021-10-10 | Stop reason: HOSPADM

## 2021-10-08 RX ORDER — AMOXICILLIN 250 MG
1 CAPSULE ORAL 2 TIMES DAILY PRN
Status: DISCONTINUED | OUTPATIENT
Start: 2021-10-08 | End: 2021-10-10 | Stop reason: HOSPADM

## 2021-10-08 RX ORDER — FOLIC ACID 1 MG/1
1 TABLET ORAL DAILY
Status: DISCONTINUED | OUTPATIENT
Start: 2021-10-09 | End: 2021-10-10 | Stop reason: HOSPADM

## 2021-10-08 RX ORDER — MAGNESIUM HYDROXIDE/ALUMINUM HYDROXICE/SIMETHICONE 120; 1200; 1200 MG/30ML; MG/30ML; MG/30ML
30 SUSPENSION ORAL EVERY 4 HOURS PRN
Status: DISCONTINUED | OUTPATIENT
Start: 2021-10-08 | End: 2021-10-10 | Stop reason: HOSPADM

## 2021-10-08 RX ORDER — NICOTINE 21 MG/24HR
1 PATCH, TRANSDERMAL 24 HOURS TRANSDERMAL DAILY
Status: DISCONTINUED | OUTPATIENT
Start: 2021-10-09 | End: 2021-10-10 | Stop reason: HOSPADM

## 2021-10-08 RX ORDER — ACETAMINOPHEN 325 MG/1
650 TABLET ORAL EVERY 4 HOURS PRN
Status: DISCONTINUED | OUTPATIENT
Start: 2021-10-08 | End: 2021-10-10 | Stop reason: HOSPADM

## 2021-10-08 RX ORDER — DIAZEPAM 5 MG
5-20 TABLET ORAL EVERY 30 MIN PRN
Status: DISCONTINUED | OUTPATIENT
Start: 2021-10-08 | End: 2021-10-10

## 2021-10-08 RX ORDER — LANOLIN ALCOHOL/MO/W.PET/CERES
100 CREAM (GRAM) TOPICAL DAILY
Status: DISCONTINUED | OUTPATIENT
Start: 2021-10-09 | End: 2021-10-10 | Stop reason: HOSPADM

## 2021-10-08 RX ORDER — TRAZODONE HYDROCHLORIDE 50 MG/1
50 TABLET, FILM COATED ORAL
Status: DISCONTINUED | OUTPATIENT
Start: 2021-10-08 | End: 2021-10-10 | Stop reason: HOSPADM

## 2021-10-08 RX ORDER — GABAPENTIN 100 MG/1
100 CAPSULE ORAL 3 TIMES DAILY PRN
Status: ON HOLD | COMMUNITY
End: 2021-10-10

## 2021-10-08 RX ORDER — MULTIPLE VITAMINS W/ MINERALS TAB 9MG-400MCG
1 TAB ORAL DAILY
Status: DISCONTINUED | OUTPATIENT
Start: 2021-10-09 | End: 2021-10-10 | Stop reason: HOSPADM

## 2021-10-08 RX ORDER — ONDANSETRON 4 MG/1
4 TABLET, ORALLY DISINTEGRATING ORAL EVERY 6 HOURS PRN
Status: DISCONTINUED | OUTPATIENT
Start: 2021-10-08 | End: 2021-10-10 | Stop reason: HOSPADM

## 2021-10-08 RX ORDER — LOPERAMIDE HCL 2 MG
2 CAPSULE ORAL 4 TIMES DAILY PRN
Status: DISCONTINUED | OUTPATIENT
Start: 2021-10-08 | End: 2021-10-10 | Stop reason: HOSPADM

## 2021-10-08 RX ADMIN — GABAPENTIN 100 MG: 100 CAPSULE ORAL at 22:16

## 2021-10-08 RX ADMIN — TRAZODONE HYDROCHLORIDE 50 MG: 50 TABLET ORAL at 22:16

## 2021-10-08 ASSESSMENT — ENCOUNTER SYMPTOMS: NERVOUS/ANXIOUS: 1

## 2021-10-08 ASSESSMENT — MIFFLIN-ST. JEOR
SCORE: 1157.57
SCORE: 1177.98

## 2021-10-08 NOTE — PHARMACY-ADMISSION MEDICATION HISTORY
Admission Medication History Completed by Pharmacy    See [facility] Admission Navigator for allergy information, preferred outpatient pharmacy, prior to admission medications and immunization status.     Medication History Sources:     Patient, fill history    Changes made to PTA medication list (reason):    Added:   o Gabapentin 100 mg capsules, per patient/dispense report (filled 42 caps/14 day supply)    Deleted: None    Changed:   o Fluoxetine 10 mg caps: take 1 cap po daily----> Fluoxetine 20 mg capsule: take 3 pills one time daily, per patient/dispense report   o Naltexone 50 mg tablet: take 1 tablet po daily---> take 2 tablets (100 mg) po daily, per patient/dispense report     Additional Information:    Patient reports that she would take 2 capsules of Gabapentin per day, last fill was 06/30/21 for a 14 days supply.     Patient reports it has been about 60 days since she last took her medications.     Prior to Admission medications    Medication Sig Last Dose Taking? Auth Provider   FLUoxetine (PROZAC) 20 MG capsule Take 60 mg by mouth daily  More than a month at Unknown time Yes Unknown, Entered By History   folic acid (FOLVITE) 1 MG tablet Take 1 tablet (1 mg) by mouth daily More than a month at Unknown time Yes Trevor Roa MD   gabapentin (NEURONTIN) 100 MG capsule Take 100 mg by mouth 3 times daily as needed (anxiety) More than a month at Unknown time Yes Reported, Patient   hydrOXYzine (ATARAX) 25 MG tablet Take 25-50 mg by mouth every 4 hours as needed for anxiety  More than a month at Unknown time Yes Reported, Patient   multivitamin w/minerals (THERA-VIT-M) tablet Take 1 tablet by mouth daily More than a month at Unknown time Yes Trevor Roa MD   naltrexone (DEPADE/REVIA) 50 MG tablet Take 1 tablet (50 mg) by mouth daily  Patient taking differently: Take 100 mg by mouth daily  More than a month at Unknown time Yes Trevor Roa MD   thiamine (B-1) 100 MG tablet Take 1 tablet (100 mg) by mouth  daily More than a month at Unknown time Yes Trevor Roa MD     Date completed: 10/08/21    Medication history completed by: Daya Singleton

## 2021-10-08 NOTE — ED PROVIDER NOTES
ED Provider Note  [facility]      History     Chief Complaint   Patient presents with     Alcohol Intoxication     Drinking 1 liter of vodka per day.  Last drink 2 hours ago.1230.  Historoy of seizures in the past. Hx DT's with hallucinations.     The history is provided by the patient.     Adri Delarosa is a 40 year old female who has a significant medical history of alcohol abuse, anxiety, and depressive disorder.  Patient presents to the ED for detox from alcohol.  Patient reports 3 recent detox admissions this calendar year most recently at [facility], and began drinking upon after discharge.  Patient states that she drinks approximately 1 L of alcohol per day.  Patient states that her last drink of alcohol was at 12:30 PM today.  Patient states that she experienced alcohol withdrawal 6 months ago. Patient endorsed history of DT and last experienced DT 4 months ago.  Patient denies a history of alcohol withdrawal seizures but reports history of seizure as a side effect of Wellbutrin.  Patient denies any other medical problems.  Patient reports being diagnosed with anxiety but has no mental health care established.    Past Medical History  Past Medical History:   Diagnosis Date     Alcohol abuse      Anxiety      Depressive disorder      Past Surgical History:   Procedure Laterality Date     GYN SURGERY      C secton and tubes tide     NO HISTORY OF SURGERY       FLUoxetine (PROZAC) 10 MG capsule  folic acid (FOLVITE) 1 MG tablet  hydrOXYzine (ATARAX) 25 MG tablet  multivitamin w/minerals (THERA-VIT-M) tablet  naltrexone (DEPADE/REVIA) 50 MG tablet  thiamine (B-1) 100 MG tablet      No Known Allergies  Family History  No family history on file.  Social History   Social History     Tobacco Use     Smoking status: Current Every Day Smoker     Packs/day: 0.50     Smokeless tobacco: Never Used   Substance Use Topics     Alcohol use: Yes     Drug use: Never      Past medical  "history, past surgical history, medications, allergies, family history, and social history were reviewed with the patient. No additional pertinent items.       Review of Systems   Psychiatric/Behavioral: The patient is nervous/anxious.    All other systems reviewed and are negative.    A complete review of systems was performed with pertinent positives and negatives noted in the HPI, and all other systems negative.    Physical Exam   BP: 136/82  Pulse: 109  Temp: 98.6  F (37  C)  Resp: 16  Height: 157.5 cm (5' 2\")  Weight: 56.5 kg (124 lb 8 oz)  SpO2: 97 %  Physical Exam  Vitals and nursing note reviewed.   Constitutional:       General: She is not in acute distress.     Appearance: Normal appearance. She is not diaphoretic.   HENT:      Head: Atraumatic.      Mouth/Throat:      Pharynx: No oropharyngeal exudate.   Eyes:      General: No scleral icterus.     Pupils: Pupils are equal, round, and reactive to light.   Cardiovascular:      Rate and Rhythm: Normal rate and regular rhythm.      Heart sounds: Normal heart sounds.   Pulmonary:      Effort: No respiratory distress.      Breath sounds: Normal breath sounds.   Abdominal:      General: Bowel sounds are normal.      Palpations: Abdomen is soft.      Tenderness: There is no abdominal tenderness.   Musculoskeletal:         General: No tenderness.   Skin:     General: Skin is warm.      Findings: No rash.   Neurological:      General: No focal deficit present.      Mental Status: She is alert and oriented to person, place, and time.         ED Course     3:01 PM  The patient was seen and examined by Lela Phillip in Room ED08.     Procedures          Results for orders placed or performed during the hospital encounter of 10/08/21   Alcohol breath test POCT     Status: Abnormal   Result Value Ref Range    Alcohol Breath Test 0.207 (A) 0.00 - 0.01   Urine Drugs of Abuse Screen     Status: None ()    Narrative    The following orders were created for panel order Urine " Drugs of Abuse Screen.  Procedure                               Abnormality         Status                     ---------                               -----------         ------                     Drug abuse screen 1 urin...[[id number]]                                                   Please view results for these tests on the individual orders.     Medications - No data to display     Assessments & Plan (with Medical Decision Making)     40-year-old female with prior history of alcohol abuse and alcohol withdrawal who comes in requesting detox.  Medical screening labs drawn and sent and all unremarkable normal CBC, electrolytes, alcohol breath test 0.207.  Case discussed with behavioral intake with arrangements made for inpatient admission for alcohol detox, [facility] protocol initiated here in emergency department.    I have reviewed the nursing notes. I have reviewed the findings, diagnosis, plan and need for follow up with the patient.    New Prescriptions    No medications on file       Final diagnoses:   Alcohol withdrawal syndrome without complication (H)     IApolinar, am serving as a trained medical scribe to document services personally performed by Lela Phillip MD, based on the provider's statements to me.      Lela CHATTERJEE MD, was physically present and have reviewed and verified the accuracy of this note documented by Apolinar Merritt.     --  Lela Phillip MD  [facility] EMERGENCY DEPARTMENT  10/8/2021     Lela Phillip MD  10/08/21 3103

## 2021-10-08 NOTE — TELEPHONE ENCOUNTER
S: MD Phillip, [place] [facility], 40/F, Detox    B: Pt reports drinking a liter of liquor daily  LD 12:30pm  Pt has hx of alcohol abuse, has been in detox 3 times this year  Pt reports seizure hx but says its from medication not alcohol  DTs unclear  Pt has hx of anxiety and depression, no current symptoms    Patient cleared and ready for behavioral bed placement: Yes   Breath .207   Utox neg  AST 74  Covid in process    A:Vol    R:3A/Morgan/MARCE  5pm-paged provider  506pm-provider accepted  509pm-unit notified, pending discharge, most likely after 7pm  515pm- ED notified by page

## 2021-10-09 LAB
CHOLEST SERPL-MCNC: 247 MG/DL
FOLATE SERPL-MCNC: 14.9 NG/ML
GGT SERPL-CCNC: 40 U/L (ref 0–40)
HDLC SERPL-MCNC: 107 MG/DL
LDLC SERPL CALC-MCNC: 130 MG/DL
NONHDLC SERPL-MCNC: 140 MG/DL
TRIGL SERPL-MCNC: 49 MG/DL
TSH SERPL DL<=0.005 MIU/L-ACNC: 1.39 MU/L (ref 0.4–4)
VIT B12 SERPL-MCNC: 375 PG/ML (ref 193–986)

## 2021-10-09 PROCEDURE — 128N000004 HC R&B CD ADULT

## 2021-10-09 PROCEDURE — 36415 COLL VENOUS BLD VENIPUNCTURE: CPT | Performed by: PSYCHIATRY & NEUROLOGY

## 2021-10-09 PROCEDURE — 82607 VITAMIN B-12: CPT | Performed by: PSYCHIATRY & NEUROLOGY

## 2021-10-09 PROCEDURE — 99207 PR CONSULT E&M CHANGED TO SUBSEQUENT LEVEL: CPT | Performed by: PHYSICIAN ASSISTANT

## 2021-10-09 PROCEDURE — HZ2ZZZZ DETOXIFICATION SERVICES FOR SUBSTANCE ABUSE TREATMENT: ICD-10-PCS | Performed by: EMERGENCY MEDICINE

## 2021-10-09 PROCEDURE — 82977 ASSAY OF GGT: CPT | Performed by: PSYCHIATRY & NEUROLOGY

## 2021-10-09 PROCEDURE — 82465 ASSAY BLD/SERUM CHOLESTEROL: CPT | Performed by: PSYCHIATRY & NEUROLOGY

## 2021-10-09 PROCEDURE — H2032 ACTIVITY THERAPY, PER 15 MIN: HCPCS

## 2021-10-09 PROCEDURE — 250N000013 HC RX MED GY IP 250 OP 250 PS 637: Performed by: PSYCHIATRY & NEUROLOGY

## 2021-10-09 PROCEDURE — 82746 ASSAY OF FOLIC ACID SERUM: CPT | Performed by: PSYCHIATRY & NEUROLOGY

## 2021-10-09 PROCEDURE — 99232 SBSQ HOSP IP/OBS MODERATE 35: CPT | Performed by: PHYSICIAN ASSISTANT

## 2021-10-09 PROCEDURE — 84443 ASSAY THYROID STIM HORMONE: CPT | Performed by: PSYCHIATRY & NEUROLOGY

## 2021-10-09 PROCEDURE — 99223 1ST HOSP IP/OBS HIGH 75: CPT | Mod: AI | Performed by: PSYCHIATRY & NEUROLOGY

## 2021-10-09 RX ORDER — MULTIPLE VITAMINS W/ MINERALS TAB 9MG-400MCG
1 TAB ORAL DAILY
Qty: 30 TABLET | Refills: 1 | Status: SHIPPED | OUTPATIENT
Start: 2021-10-09 | End: 2021-10-10

## 2021-10-09 RX ORDER — FOLIC ACID 1 MG/1
1 TABLET ORAL DAILY
Qty: 30 TABLET | Refills: 1 | Status: SHIPPED | OUTPATIENT
Start: 2021-10-09 | End: 2021-10-10

## 2021-10-09 RX ORDER — NALTREXONE HYDROCHLORIDE 50 MG/1
50 TABLET, FILM COATED ORAL DAILY
Qty: 30 TABLET | Refills: 3 | Status: ON HOLD | OUTPATIENT
Start: 2021-10-10 | End: 2022-02-12

## 2021-10-09 RX ADMIN — FOLIC ACID 1 MG: 1 TABLET ORAL at 08:09

## 2021-10-09 RX ADMIN — FLUOXETINE 20 MG: 20 CAPSULE ORAL at 12:12

## 2021-10-09 RX ADMIN — GABAPENTIN 100 MG: 100 CAPSULE ORAL at 20:25

## 2021-10-09 RX ADMIN — NICOTINE 1 PATCH: 21 PATCH, EXTENDED RELEASE TRANSDERMAL at 08:09

## 2021-10-09 RX ADMIN — GABAPENTIN 100 MG: 100 CAPSULE ORAL at 08:09

## 2021-10-09 RX ADMIN — MULTIPLE VITAMINS W/ MINERALS TAB 1 TABLET: TAB at 08:09

## 2021-10-09 RX ADMIN — THIAMINE HCL TAB 100 MG 100 MG: 100 TAB at 08:09

## 2021-10-09 RX ADMIN — GABAPENTIN 100 MG: 100 CAPSULE ORAL at 12:12

## 2021-10-09 RX ADMIN — TRAZODONE HYDROCHLORIDE 50 MG: 50 TABLET ORAL at 21:19

## 2021-10-09 ASSESSMENT — ACTIVITIES OF DAILY LIVING (ADL)
DRESS: INDEPENDENT
HYGIENE/GROOMING: INDEPENDENT
ORAL_HYGIENE: INDEPENDENT

## 2021-10-09 NOTE — PROGRESS NOTES
10/08/21 2128   Patient Belongings   Did you bring any home meds/supplements to the hospital?    (3 eyedrops)   Patient Belongings remains with patient;sent to security per site process;returned to patient at discharge   Patient Belongings Remaining with Patient clothing;shoes   Patient Belongings Put in Hospital Secure Location (Security or Locker, etc.) cash/credit card;cell phone/electronics;clothing;other (see comments)   Belongings Search Yes   Clothing Search Yes   Second Staff Franca   [facility] room : phone,  and necklace   Locked storage: pants with string and purse (lighters, cigarettes, lotion, checkbook..etc )   Security- [id number]: 2 [place] ID, 2 [employer] card( [id number] & [id number]), [employer] card and $6.00 cash.  A               Admission:  I am responsible for any personal items that are not sent to the safe or pharmacy.  [facility] [facility] is not responsible for loss, theft or damage of any property in my possession.    Signature:  _________________________________ Date: _______  Time: _____                                              Staff Signature:  ____________________________ Date: ________  Time: _____      2nd Staff person, if patient is unable/unwilling to sign:    Signature: ________________________________ Date: ________  Time: _____     Discharge:  [facility] has returned all of my personal belongings:    Signature: _________________________________ Date: ________  Time: _____                                          Staff Signature:  ____________________________ Date: ________  Time: _____

## 2021-10-09 NOTE — PLAN OF CARE
"  Problem: General Rehab Plan of Care  Goal: Therapeutic Recreation/Music Therapy Goal  Description: The patient and/or their representative will achieve their patient-specific goals related to the plan of care.  The patient-specific goals include:  Outcome: No Change     Adri attended art therapy group for 1 hour (8 patients total). She reported feeling \"anxious.\" She engaged in discussion about relationships, identifying her strengths in relationships as being \"a good listener and open-minded.\" She participated in the art activity- collaborative art making. She was actively engaged in art making. She interacted with peers appropriately and pleasantly. She engaged in group conversation and reported enjoying the activity.   "

## 2021-10-09 NOTE — CONSULTS
[facility]  Consult Note - Hospitalist Service     Date of Admission:  10/8/2021  Consult Requested by: Jaciel Mccoy MD  Reason for Consult: Medical Co-management of alcohol detox     Assessment & Plan   Adri Delarosa is a 40 year old female with PMHx of alcohol abuse, anxiety, depression admitted on 10/8/2021 to inpatient psychiatry for alcohol detox.     #Alcohol abuse with withdrawal - Drinking 1L of alcohol daily. Alcohol level 0.207 on admission. Denies hx of DTs. Endorses a hx of seizures in setting of using Wellbutrin. PTA on naltrexone 50 mg daily.   - Agree with [facility] protocol, management per psychiatry   - Agree with Thiamine, folic acid and MVI     #Anxiety, Depression - PTA medications with atarax 25-50 mg Q4H PRN, fluoxetine 60 mg daily, gabapentin 100 mg TID PRN anxiety    - Management per psychiatry     #Transaminitis - AST elevated at 74. Remaining hepatic panel WNL. Likely due to alcohol use. Recommend outpatient follow up with PCP in 1 week.     #Elevated blood pressure- 145/91, likely due to alcohol withdrawal.   - Please notify medicine if SBP >180 or DBP >110, can consider adding PRN clonidine  - Please notify medicine if BP >160/90 after completing alcohol detox     Medicine will sign off. No further recommendations at this time. Please page the on-call MUKUL for any intercurrent medical issues which arise.     JONATHAN [facility]  Securely message with the Vocera Web Console (learn more here)  Text page via [employer] Paging/Directory      Clinically Significant Risk Factors Present on Admission          # Hypocalcemia: Ca = 8.4 mg/dL (Ref range: 8.5 - 10.1 mg/dL) and/or iCa = N/A on admission, will replace as needed          ______________________________________________________________________    Chief Complaint   Alcohol abuse    History is obtained from the patient    History of  Present Illness   Adri Delarosa is a 40 year old female with PMHx of alcohol abuse, anxiety, depression admitted on 10/8/2021 to inpatient psychiatry for alcohol detox.     Patient reports drinking 3L liquor over 4 days. Denies any hx of DTs. Has a hx of seizure, but thought to be due to wellbutrin and not withdrawal. Reporting a lot of anxiety. Denies any acute concerns. Denies any F/C, CP, SOB, N/V/D, abdominal pain, urinary symptoms. LMP was 1 month ago. Denies any issues with menses. Patient wants to resume all medications, including naltrexone, and anxiety medications.     Review of Systems   The 10 point Review of Systems is negative other than noted in the HPI or here.     Past Medical History    I have reviewed this patient's medical history and updated it with pertinent information if needed.   Past Medical History:   Diagnosis Date     Alcohol abuse      Anxiety      Depressive disorder        Past Surgical History   I have reviewed this patient's surgical history and updated it with pertinent information if needed.  Past Surgical History:   Procedure Laterality Date     GYN SURGERY      C secton and tubes tide     NO HISTORY OF SURGERY         Social History   I have reviewed this patient's social history and updated it with pertinent information if needed.  Social History     Tobacco Use     Smoking status: Current Every Day Smoker     Packs/day: 0.50     Smokeless tobacco: Never Used   Substance Use Topics     Alcohol use: Yes     Drug use: Never       Family History   I have reviewed this patient's family history and updated it with pertinent information if needed.  Family History   Problem Relation Age of Onset     Alcoholism Maternal Grandmother        Medications   I have reviewed this patient's current medications  Current Facility-Administered Medications   Medication     acetaminophen (TYLENOL) tablet 650 mg     alum & mag hydroxide-simethicone (MAALOX) suspension 30 mL     atenolol (TENORMIN)  tablet 50 mg     diazepam (VALIUM) tablet 5-20 mg     FLUoxetine (PROzac) capsule 20 mg     folic acid (FOLVITE) tablet 1 mg     gabapentin (NEURONTIN) capsule 100 mg     loperamide (IMODIUM) capsule 2 mg     multivitamin w/minerals (THERA-VIT-M) tablet 1 tablet     [START ON 10/10/2021] naltrexone (DEPADE;REVIA) half-tab 25 mg     nicotine (NICODERM CQ) 21 MG/24HR 24 hr patch 1 patch     nicotine Patch in Place     ondansetron (ZOFRAN-ODT) ODT tab 4 mg     senna-docusate (SENOKOT-S/PERICOLACE) 8.6-50 MG per tablet 1 tablet     thiamine (B-1) tablet 100 mg     traZODone (DESYREL) tablet 50 mg       Allergies   No Known Allergies    Physical Exam   Vital Signs: Temp: 97.8  F (36.6  C) Temp src: Temporal BP: (!) 145/91 Pulse: 91   Resp: 16 SpO2: 96 % O2 Device: None (Room air)    Weight: 120 lbs 0 oz  GENERAL: Alert and oriented x 3. NAD. Pleasant and conversational   HEENT: Anicteric sclera. AT/NC. Mucous membranes moist   CARDIOVASCULAR: RRR. S1, S2. No murmurs, rubs, or gallops.   RESPIRATORY: Effort normal on RA. Clear to auscultation bilaterally, no rales, rhonchi or wheezes, respirations unlabored   GI: Abdomen soft, non-tender abdomen without rebound or guarding, normoactive bowel sounds present  EXTREMITIES: No peripheral edema.   NEUROLOGICAL: .CN II-XII grossly intact. Moving all extremities symmetrically.   SKIN: Intact. Warm and dry. No jaundice.     Data   Results for orders placed or performed during the hospital encounter of 10/08/21 (from the past 24 hour(s))   Alcohol breath test POCT   Result Value Ref Range    Alcohol Breath Test 0.207 (A) 0.00 - 0.01   HCG qualitative urine   Result Value Ref Range    hCG Urine Qualitative Negative Negative   Urine Drugs of Abuse Screen    Narrative    The following orders were created for panel order Urine Drugs of Abuse Screen.  Procedure                               Abnormality         Status                     ---------                                -----------         ------                     Drug abuse screen 1 urin...[[id number]]  Normal              Final result                 Please view results for these tests on the individual orders.   Drug abuse screen 1 urine (ED)   Result Value Ref Range    Amphetamines Urine Screen Negative Screen Negative    Barbiturates Urine Screen Negative Screen Negative    Benzodiazepines Urine Screen Negative Screen Negative    Cannabinoids Urine Screen Negative Screen Negative    Cocaine Urine Screen Negative Screen Negative    Opiates Urine Screen Negative Screen Negative   CBC with platelets differential    Narrative    The following orders were created for panel order CBC with platelets differential.  Procedure                               Abnormality         Status                     ---------                               -----------         ------                     CBC with platelets and d...[[id number]]  Abnormal            Final result                 Please view results for these tests on the individual orders.   Comprehensive metabolic panel   Result Value Ref Range    Sodium 139 133 - 144 mmol/L    Potassium 3.6 3.4 - 5.3 mmol/L    Chloride 106 94 - 109 mmol/L    Carbon Dioxide (CO2) 22 20 - 32 mmol/L    Anion Gap 11 3 - 14 mmol/L    Urea Nitrogen 10 7 - 30 mg/dL    Creatinine 0.74 0.52 - 1.04 mg/dL    Calcium 8.4 (L) 8.5 - 10.1 mg/dL    Glucose 88 70 - 99 mg/dL    Alkaline Phosphatase 48 40 - 150 U/L    AST 74 (H) 0 - 45 U/L    ALT 46 0 - 50 U/L    Protein Total 7.1 6.8 - 8.8 g/dL    Albumin 3.6 3.4 - 5.0 g/dL    Bilirubin Total 0.5 0.2 - 1.3 mg/dL    GFR Estimate >90 >60 mL/min/1.73m2   Asymptomatic COVID-19 Virus (Coronavirus) by PCR Oropharynx    Specimen: Oropharynx; Swab   Result Value Ref Range    SARS CoV2 PCR Negative Negative    Narrative    Testing was performed using the Xpert Xpress SARS-CoV-2 Assay on the  [employer]. Additional information about  this Emergency Use  Authorization (EUA) assay can be found via the Lab  Guide. This test should be ordered for the detection of SARS-CoV-2 in  individuals who meet SARS-CoV-2 clinical and/or epidemiological  criteria. Test performance is unknown in asymptomatic patients. This  test is for in vitro diagnostic use under the FDA EUA for  laboratories certified under CLIA to perform high complexity testing.  This test has not been FDA cleared or approved. A negative result  does not rule out the presence of PCR inhibitors in the specimen or  target RNA in concentration below the limit of detection for the  assay. The possibility of a false negative should be considered if  the patient's recent exposure or clinical presentation suggests  COVID-19. This test was validated by the [facility]. This laboratory is certified under  the Clinical Laboratory Improvement Amendments of 1988 (CLIA-88) as  qualified to perform high complexity laboratory testing.     CBC with platelets and differential   Result Value Ref Range    WBC Count 7.9 4.0 - 11.0 10e3/uL    RBC Count 4.14 3.80 - 5.20 10e6/uL    Hemoglobin 14.3 11.7 - 15.7 g/dL    Hematocrit 40.3 35.0 - 47.0 %    MCV 97 78 - 100 fL    MCH 34.5 (H) 26.5 - 33.0 pg    MCHC 35.5 31.5 - 36.5 g/dL    RDW 11.8 10.0 - 15.0 %    Platelet Count 274 150 - 450 10e3/uL    % Neutrophils 50 %    % Lymphocytes 39 %    % Monocytes 9 %    % Eosinophils 1 %    % Basophils 1 %    % Immature Granulocytes 0 %    NRBCs per 100 WBC 0 <1 /100    Absolute Neutrophils 4.0 1.6 - 8.3 10e3/uL    Absolute Lymphocytes 3.1 0.8 - 5.3 10e3/uL    Absolute Monocytes 0.7 0.0 - 1.3 10e3/uL    Absolute Eosinophils 0.1 0.0 - 0.7 10e3/uL    Absolute Basophils 0.1 0.0 - 0.2 10e3/uL    Absolute Immature Granulocytes 0.0 <=0.0 10e3/uL    Absolute NRBCs 0.0 10e3/uL   GGT   Result Value Ref Range    GGT 40 0 - 40 U/L   Lipid panel   Result Value Ref Range    Cholesterol 247 (H) <200 mg/dL     Triglycerides 49 <150 mg/dL    Direct Measure  >=50 mg/dL    LDL Cholesterol Calculated 130 (H) <=100 mg/dL    Non HDL Cholesterol 140 (H) <130 mg/dL    Narrative    Cholesterol  Desirable:  <200 mg/dL    Triglycerides  Normal:  Less than 150 mg/dL  Borderline High:  150-199 mg/dL  High:  200-499 mg/dL  Very High:  Greater than or equal to 500 mg/dL    Direct Measure HDL  Female:  Greater than or equal to 50 mg/dL   Male:  Greater than or equal to 40 mg/dL    LDL Cholesterol  Desirable:  <100mg/dL  Above Desirable:  100-129 mg/dL   Borderline High:  130-159 mg/dL   High:  160-189 mg/dL   Very High:  >= 190 mg/dL    Non HDL Cholesterol  Desirable:  130 mg/dL  Above Desirable:  130-159 mg/dL  Borderline High:  160-189 mg/dL  High:  190-219 mg/dL  Very High:  Greater than or equal to 220 mg/dL   TSH with free T4 reflex and/or T3 as indicated   Result Value Ref Range    TSH 1.39 0.40 - 4.00 mU/L

## 2021-10-09 NOTE — PLAN OF CARE
"DA Rush [facility] Pawanyadiel  40/F    S = Situation:     Voluntary admit from [place] ED for alcohol    B  = Background:     Previous admit to [facility] September 2020    States a 4-day binge on vodka, drinking \"mini bottles\" and unable to specify exact amount per day, guesstimates 3 liters in total during binge. Last drink 10/8 at 12:30 PM    Denies DTs, denies seizures (ED report indicated Pt had seizure as side effect from Wellbutrin)    Medical: Pt denies other medical concerns    Pt states she is noncompliant with home medications, has not taken in last 30-60 days    ED administered no medications before sending to unit      A  =  Assessment:     MSSA = 6 (130/89, pulse 81)  ALT 46, AST 74    COVID negative, HCG negative    Denies SI.     Patient is insistent she will discharge on Sunday because of daughter's upcoming birthday. Pt states she wants no valium because \"they said downstairs if I take valium I can't leave\" (admitting RN explained need to be free of significant withdrawal symptoms for 24 hours, not just refusing valium)    Pt lives in sober house, states they required her to come to detox      R =   Request or Recommendation:     MSSA with valium, withdrawal precautions    Psychiatry, internal medicine to meet on 10/9    Labs ordered: GGT, B12/folate, TSH, lipids      "

## 2021-10-09 NOTE — H&P
"[facility], [place]   Psychiatric History and Physical  Admission date: 10/8/2021        Chief Complaint:   \"I'm doing fine.\"         HPI:     The patient is a 39yo female with a history of alcohol use disorder and depression who was admitted to detoxify from alcohol. Reports that she is doing well and that she was sent here \"as a requirement for returning to my sober home.\" Is hopeful to discharge tomorrow as her daughter turns 14 on the 14th. Mood is \"a little anxious.\" Does feel some guilt and disappointment. Sleeping and eating well. Denies SI or HI. Denies AH or VH. Open to getting back on Naltrexone and Prozac.     Per ER:  Adri Delarosa is a 40 year old female who has a significant medical history of alcohol abuse, anxiety, and depressive disorder.  Patient presents to the ED for detox from alcohol.  Patient reports 3 recent detox admissions this calendar year most recently at [facility], and began drinking upon after discharge.  Patient states that she drinks approximately 1 L of alcohol per day.  Patient states that her last drink of alcohol was at 12:30 PM today.  Patient states that she experienced alcohol withdrawal 6 months ago. Patient endorsed history of DT and last experienced DT 4 months ago.  Patient denies a history of alcohol withdrawal seizures but reports history of seizure as a side effect of Wellbutrin.  Patient denies any other medical problems.  Patient reports being diagnosed with anxiety but has no mental health care established.        Past Psychiatric History:     History of MDD. Has been on Prozac most recently.         Substance Use and History:     Alcohol use disorder. Records indicate history of withdrawal seizures and DTs. Patient reports that seizure was from Wellbutrin.         Past Medical History:   PAST MEDICAL HISTORY:   Past Medical History:   Diagnosis Date     Alcohol abuse      Anxiety      Depressive disorder        PAST SURGICAL " HISTORY:   Past Surgical History:   Procedure Laterality Date     GYN SURGERY      C secton and tubes tide     NO HISTORY OF SURGERY               Family History:   FAMILY HISTORY: Grandmother with alcohol use disorder. Sober 42 years.         Social History:   Please see the full psychosocial profile from the clinical treatment coordinator.   SOCIAL HISTORY:   Social History     Tobacco Use     Smoking status: Current Every Day Smoker     Packs/day: 0.50     Smokeless tobacco: Never Used   Substance Use Topics     Alcohol use: Yes            Physical ROS:   The 10-point review of systems was negative except as noted in HPI.         PTA Medications:     Medications Prior to Admission   Medication Sig Dispense Refill Last Dose     FLUoxetine (PROZAC) 20 MG capsule Take 60 mg by mouth daily    More than a month at Unknown time     folic acid (FOLVITE) 1 MG tablet Take 1 tablet (1 mg) by mouth daily 90 tablet 1 More than a month at Unknown time     gabapentin (NEURONTIN) 100 MG capsule Take 100 mg by mouth 3 times daily as needed (anxiety)   More than a month at Unknown time     hydrOXYzine (ATARAX) 25 MG tablet Take 25-50 mg by mouth every 4 hours as needed for anxiety    More than a month at Unknown time     multivitamin w/minerals (THERA-VIT-M) tablet Take 1 tablet by mouth daily 90 each 1 More than a month at Unknown time     naltrexone (DEPADE/REVIA) 50 MG tablet Take 1 tablet (50 mg) by mouth daily (Patient taking differently: Take 100 mg by mouth daily ) 90 tablet 1 More than a month at Unknown time     thiamine (B-1) 100 MG tablet Take 1 tablet (100 mg) by mouth daily 90 tablet 1 More than a month at Unknown time          Allergies:   No Known Allergies       Labs:     Recent Results (from the past 48 hour(s))   Alcohol breath test POCT    Collection Time: 10/08/21  2:40 PM   Result Value Ref Range    Alcohol Breath Test 0.207 (A) 0.00 - 0.01   HCG qualitative urine    Collection Time: 10/08/21  2:42 PM    Result Value Ref Range    hCG Urine Qualitative Negative Negative   Drug abuse screen 1 urine (ED)    Collection Time: 10/08/21  2:42 PM   Result Value Ref Range    Amphetamines Urine Screen Negative Screen Negative    Barbiturates Urine Screen Negative Screen Negative    Benzodiazepines Urine Screen Negative Screen Negative    Cannabinoids Urine Screen Negative Screen Negative    Cocaine Urine Screen Negative Screen Negative    Opiates Urine Screen Negative Screen Negative   Comprehensive metabolic panel    Collection Time: 10/08/21  3:41 PM   Result Value Ref Range    Sodium 139 133 - 144 mmol/L    Potassium 3.6 3.4 - 5.3 mmol/L    Chloride 106 94 - 109 mmol/L    Carbon Dioxide (CO2) 22 20 - 32 mmol/L    Anion Gap 11 3 - 14 mmol/L    Urea Nitrogen 10 7 - 30 mg/dL    Creatinine 0.74 0.52 - 1.04 mg/dL    Calcium 8.4 (L) 8.5 - 10.1 mg/dL    Glucose 88 70 - 99 mg/dL    Alkaline Phosphatase 48 40 - 150 U/L    AST 74 (H) 0 - 45 U/L    ALT 46 0 - 50 U/L    Protein Total 7.1 6.8 - 8.8 g/dL    Albumin 3.6 3.4 - 5.0 g/dL    Bilirubin Total 0.5 0.2 - 1.3 mg/dL    GFR Estimate >90 >60 mL/min/1.73m2   Asymptomatic COVID-19 Virus (Coronavirus) by PCR Oropharynx    Collection Time: 10/08/21  3:41 PM    Specimen: Oropharynx; Swab   Result Value Ref Range    SARS CoV2 PCR Negative Negative   CBC with platelets and differential    Collection Time: 10/08/21  3:41 PM   Result Value Ref Range    WBC Count 7.9 4.0 - 11.0 10e3/uL    RBC Count 4.14 3.80 - 5.20 10e6/uL    Hemoglobin 14.3 11.7 - 15.7 g/dL    Hematocrit 40.3 35.0 - 47.0 %    MCV 97 78 - 100 fL    MCH 34.5 (H) 26.5 - 33.0 pg    MCHC 35.5 31.5 - 36.5 g/dL    RDW 11.8 10.0 - 15.0 %    Platelet Count 274 150 - 450 10e3/uL    % Neutrophils 50 %    % Lymphocytes 39 %    % Monocytes 9 %    % Eosinophils 1 %    % Basophils 1 %    % Immature Granulocytes 0 %    NRBCs per 100 WBC 0 <1 /100    Absolute Neutrophils 4.0 1.6 - 8.3 10e3/uL    Absolute Lymphocytes 3.1 0.8 - 5.3 10e3/uL  "   Absolute Monocytes 0.7 0.0 - 1.3 10e3/uL    Absolute Eosinophils 0.1 0.0 - 0.7 10e3/uL    Absolute Basophils 0.1 0.0 - 0.2 10e3/uL    Absolute Immature Granulocytes 0.0 <=0.0 10e3/uL    Absolute NRBCs 0.0 10e3/uL          Physical and Psychiatric Examination:     BP 98/63   Pulse 63   Temp 97.2  F (36.2  C) (Temporal)   Resp 16   Ht 1.575 m (5' 2\")   Wt 54.4 kg (120 lb)   LMP 09/08/2021 (Exact Date)   SpO2 97%   Breastfeeding No   BMI 21.95 kg/m    Weight is 120 lbs 0 oz  Body mass index is 21.95 kg/m .    Physical Exam:  I have reviewed the physical exam as documented by the medical team and agree with findings and assessment and have no additional findings to add at this time.    Mental Status Exam:  Appearance: awake, alert and adequately groomed  Attitude:  cooperative  Eye Contact:  good  Mood:  anxious  Affect:  mood congruent  Speech:  clear, coherent  Language: fluent and intact in English  Psychomotor, Gait, Musculoskeletal:  no evidence of tardive dyskinesia, dystonia, or tics  Thought Process:  goal oriented  Associations:  no loose associations  Thought Content:  no evidence of suicidal ideation or homicidal ideation and no evidence of psychotic thought  Insight:  fair  Judgement:  fair  Oriented to:  time, person, and place  Attention Span and Concentration:  intact  Recent and Remote Memory:  intact  Fund of Knowledge:  appropriate         Admission Diagnoses:   Alcohol withdrawal syndrome with complication (H)  MDD, recurrent, moderate         Assessment & Plan:     1) MSSA with Valium.   2) Restart Prozac 20mg Qday.   3) Restart Naltrexone tomorrow.   4) Possible discharge tomorrow.     Disposition Plan   Reason for ongoing admission: requires detoxification from substance that poses a risk of bodily harm during withdrawal period  Discharge location: [facility] facility  Discharge Medications: not ordered  Follow-up Appointments: not scheduled  Legal Status: " voluntary    Entered by: Jaciel Mccoy on 10/9/2021 at 6:30 AM

## 2021-10-09 NOTE — ED NOTES
"ED to Behavioral Floor Handoff    SITUATION  Adri Delarosa is a 40 year old female who speaks English and lives in a home with family members The patient arrived in the ED by private car from home with a complaint of Alcohol Intoxication (Drinking 1 liter of vodka per day.  Last drink 2 hours ago.1230.  Historoy of seizures in the past. Hx DT's with hallucinations.)  .The patient's current symptoms started/worsened 2 week(s) ago and during this time the symptoms have increased.   In the ED, pt was diagnosed with   Final diagnoses:   Alcohol withdrawal syndrome without complication (H)        Initial vitals were: BP: 136/82  Pulse: 109  Temp: 98.6  F (37  C)  Resp: 16  Height: 157.5 cm (5' 2\")  Weight: 56.5 kg (124 lb 8 oz)  SpO2: 97 %   --------  Is the patient diabetic? No   If yes, last blood glucose? --     If yes, was this treated in the ED? --  --------  Is the patient inebriated (ETOH) Yes or Impaired on other substances?  MSSA done? Yes  Last MSSA score: --    Were withdrawal symptoms treated? Yes  Does the patient have a seizure history? No. If yes, date of most recent seizure--  --------  Is the patient patient experiencing suicidal ideation? denies current or recent suicidal ideation     Homicidal ideation? denies current or recent homicidal ideation or behaviors.    Self-injurious behavior/urges? denies current or recent self injurious behavior or ideation.  ------  Was pt aggressive in the ED No  Was a code called No  Is the pt now cooperative? Yes  -------  Meds given in ED: Medications - No data to display   Family present during ED course? No  Family currently present? No    BACKGROUND  Does the patient have a cognitive impairment or developmental disability? No  Allergies: No Known Allergies.   Social demographics are   Social History     Socioeconomic History     Marital status:      Spouse name: None     Number of children: None     Years of education: None     Highest education level: " None   Occupational History     None   Tobacco Use     Smoking status: Current Every Day Smoker     Packs/day: 0.50     Smokeless tobacco: Never Used   Substance and Sexual Activity     Alcohol use: Yes     Drug use: Never     Sexual activity: Yes     Partners: Male     Birth control/protection: Female Surgical   Other Topics Concern     None   Social History Narrative     None     Social Determinants of Health     Financial Resource Strain:      Difficulty of Paying Living Expenses:    Food Insecurity:      Worried About Running Out of Food in the Last Year:      Ran Out of Food in the Last Year:    Transportation Needs:      Lack of Transportation (Medical):      Lack of Transportation (Non-Medical):    Physical Activity:      Days of Exercise per Week:      Minutes of Exercise per Session:    Stress:      Feeling of Stress :    Social Connections:      Frequency of Communication with Friends and Family:      Frequency of Social Gatherings with Friends and Family:      Attends [employer] Services:      Active Member of Clubs or Organizations:      Attends Club or Organization Meetings:      Marital Status:    Intimate Partner Violence:      Fear of Current or Ex-Partner:      Emotionally Abused:      Physically Abused:      Sexually Abused:         ASSESSMENT  Labs results   Labs Ordered and Resulted from Time of ED Arrival Up to the Time of Departure from the ED   COMPREHENSIVE METABOLIC PANEL - Abnormal; Notable for the following components:       Result Value    Calcium 8.4 (*)     AST 74 (*)     All other components within normal limits   CBC WITH PLATELETS AND DIFFERENTIAL - Abnormal; Notable for the following components:    MCH 34.5 (*)     All other components within normal limits   ALCOHOL BREATH TEST POCT - Abnormal; Notable for the following components:    Alcohol Breath Test 0.207 (*)     All other components within normal limits   HCG QUALITATIVE URINE - Normal   DRUG ABUSE SCREEN 1 URINE (ED) - Normal    COVID-19 VIRUS (CORONAVIRUS) BY PCR - Normal    Narrative:     Testing was performed using the Xpert Xpress SARS-CoV-2 Assay on the  web care [employer]. Additional information about  this Emergency Use Authorization (EUA) assay can be found via the Lab  Guide. This test should be ordered for the detection of SARS-CoV-2 in  individuals who meet SARS-CoV-2 clinical and/or epidemiological  criteria. Test performance is unknown in asymptomatic patients. This  test is for in vitro diagnostic use under the FDA EUA for  laboratories certified under CLIA to perform high complexity testing.  This test has not been FDA cleared or approved. A negative result  does not rule out the presence of PCR inhibitors in the specimen or  target RNA in concentration below the limit of detection for the  assay. The possibility of a false negative should be considered if  the patient's recent exposure or clinical presentation suggests  COVID-19. This test was validated by the [facility]. This laboratory is certified under  the Clinical Laboratory Improvement Amendments of 1988 (CLIA-88) as  qualified to perform high complexity laboratory testing.     URINE DRUGS OF ABUSE SCREEN    Narrative:     The following orders were created for panel order Urine Drugs of Abuse Screen.  Procedure                               Abnormality         Status                     ---------                               -----------         ------                     Drug abuse screen 1 urin...[[id number]]  Normal              Final result                 Please view results for these tests on the individual orders.   CBC WITH PLATELETS & DIFFERENTIAL    Narrative:     The following orders were created for panel order CBC with platelets differential.  Procedure                               Abnormality         Status                     ---------                               -----------         " ------                     CBC with platelets and d...[[id number]]  Abnormal            Final result                 Please view results for these tests on the individual orders.      Imaging Studies: No results found for this or any previous visit (from the past 24 hour(s)).   Most recent vital signs /82   Pulse 109   Temp 98.6  F (37  C) (Oral)   Resp 16   Ht 1.575 m (5' 2\")   Wt 56.5 kg (124 lb 8 oz)   LMP 09/08/2021 (Exact Date)   SpO2 97%   Breastfeeding No   BMI 22.77 kg/m     Abnormal labs/tests/findings requiring intervention:---   Pain control: pt had none  Nausea control: pt had none    RECOMMENDATION  Are any infection precautions needed (MRSA, VRE, etc.)? No If yes, what infection? --  ---  Does the patient have mobility issues? independently. If yes, what device does the pt use? ---  ---  Is patient on 72 hour hold or commitment? No If on 72 hour hold, have hold and rights been given to patient? N/A  Are admitting orders written if after 10 p.m. ?N/A  Tasks needing to be completed:---     Miranda Reyes, ALEXEY   [facility]--    [phone]   "

## 2021-10-09 NOTE — PLAN OF CARE
"Pt continues to be monitored for alcohol withdrawal. MSSA = 4 and 5 this shift.  Pt symptoms are minimal. She endorses anxiety. She is visible in the lounge. She is pleasant and cooperative. She denies SI/SIB/HI. Anticipate she will complete the detox process tomorrow. She wishes to discharge back to her sober housing.  /89 (BP Location: Left arm)   Pulse 81   Temp 98.1  F (36.7  C) (Temporal)   Resp 16   Ht 1.575 m (5' 2\")   Wt 54.4 kg (120 lb)   LMP 09/08/2021 (Exact Date)   SpO2 96%   Breastfeeding No   BMI 21.95 kg/m      "

## 2021-10-09 NOTE — PLAN OF CARE
Patient appeared to be asleep during safety checks this shift. MSSA scores were 3 and 1. Patient did not receive prn Valium this shift. Will continue to monitor and update if there are changes.

## 2021-10-10 VITALS
DIASTOLIC BLOOD PRESSURE: 83 MMHG | TEMPERATURE: 97.5 F | SYSTOLIC BLOOD PRESSURE: 119 MMHG | HEART RATE: 83 BPM | WEIGHT: 120 LBS | RESPIRATION RATE: 16 BRPM | OXYGEN SATURATION: 99 % | BODY MASS INDEX: 22.08 KG/M2 | HEIGHT: 62 IN

## 2021-10-10 PROCEDURE — 250N000013 HC RX MED GY IP 250 OP 250 PS 637: Performed by: PSYCHIATRY & NEUROLOGY

## 2021-10-10 PROCEDURE — 99239 HOSP IP/OBS DSCHRG MGMT >30: CPT | Performed by: PSYCHIATRY & NEUROLOGY

## 2021-10-10 RX ORDER — MULTIPLE VITAMINS W/ MINERALS TAB 9MG-400MCG
1 TAB ORAL DAILY
Qty: 30 TABLET | Refills: 1 | Status: ON HOLD | OUTPATIENT
Start: 2021-10-10 | End: 2022-02-12

## 2021-10-10 RX ORDER — GABAPENTIN 100 MG/1
100 CAPSULE ORAL 3 TIMES DAILY PRN
Qty: 90 CAPSULE | Refills: 1 | Status: ON HOLD | OUTPATIENT
Start: 2021-10-10 | End: 2022-02-12

## 2021-10-10 RX ORDER — HYDROXYZINE HYDROCHLORIDE 25 MG/1
25-50 TABLET, FILM COATED ORAL EVERY 4 HOURS PRN
Qty: 90 TABLET | Refills: 1 | Status: ON HOLD | OUTPATIENT
Start: 2021-10-10 | End: 2022-02-12

## 2021-10-10 RX ORDER — FOLIC ACID 1 MG/1
1 TABLET ORAL DAILY
Qty: 30 TABLET | Refills: 1 | Status: ON HOLD | OUTPATIENT
Start: 2021-10-10 | End: 2022-02-12

## 2021-10-10 RX ADMIN — FLUOXETINE 20 MG: 20 CAPSULE ORAL at 08:24

## 2021-10-10 RX ADMIN — THIAMINE HCL TAB 100 MG 100 MG: 100 TAB at 08:24

## 2021-10-10 RX ADMIN — FOLIC ACID 1 MG: 1 TABLET ORAL at 08:24

## 2021-10-10 RX ADMIN — Medication 25 MG: at 08:24

## 2021-10-10 RX ADMIN — GABAPENTIN 100 MG: 100 CAPSULE ORAL at 08:26

## 2021-10-10 RX ADMIN — MULTIPLE VITAMINS W/ MINERALS TAB 1 TABLET: TAB at 08:24

## 2021-10-10 NOTE — PLAN OF CARE
Problem: Alcohol Withdrawal  Goal: Alcohol Withdrawal Symptom Control  Outcome: Completed       Pt has not required medications for withdrawal for 24 hours. Pt is now placed in Out Of Detox status.     Pt has not received any withdrawal medications since arrival at hospital on Friday 10/8. Pt plan is to discharge back to sober housing on [place] 10/10

## 2021-10-10 NOTE — DISCHARGE SUMMARY
"Psychiatric Discharge Summary    Adri Delarosa MRN# [id number]   Age: 40 year old YOB: 1980     Date of Admission:  10/8/2021  Date of Discharge:  10/10/2021  9:52 AM  Admitting Physician:  Jaciel Mccoy MD  Discharge Physician:  Jaciel Mccoy MD          Event Leading to Hospitalization:   The patient is a 39yo female with a history of alcohol use disorder and depression who was admitted to detoxify from alcohol. Reports that she is doing well and that she was sent here \"as a requirement for returning to my sober home.\" Is hopeful to discharge tomorrow as her daughter turns 14 on the 14th. Mood is \"a little anxious.\" Does feel some guilt and disappointment. Sleeping and eating well. Denies SI or HI. Denies AH or VH. Open to getting back on Naltrexone and Prozac.        See Admission note by Jaciel Mccoy MD for additional details.          Diagnoses:     Alcohol withdrawal syndrome with complication (H)  MDD, recurrent, moderate         Labs:        Lab Results   Component Value Date     10/08/2021     09/17/2020    Lab Results   Component Value Date    CHLORIDE 106 10/08/2021    CHLORIDE 108 09/17/2020    Lab Results   Component Value Date    BUN 10 10/08/2021    BUN 15 09/17/2020      Lab Results   Component Value Date    POTASSIUM 3.6 10/08/2021    POTASSIUM 3.8 09/17/2020    Lab Results   Component Value Date    CO2 22 10/08/2021    CO2 25 09/17/2020    Lab Results   Component Value Date    CR 0.74 10/08/2021    CR 0.69 09/17/2020          Lab Results   Component Value Date    WBC 7.9 10/08/2021    HGB 14.3 10/08/2021    HCT 40.3 10/08/2021    MCV 97 10/08/2021     10/08/2021     Lab Results   Component Value Date    AST 74 (H) 10/08/2021    ALT 46 10/08/2021    GGT 40 10/09/2021    ALKPHOS 48 10/08/2021    BILITOTAL 0.5 10/08/2021     Lab Results   Component Value Date    TSH 1.39 10/09/2021            Consults:   Consultation during this admission " received from internal medicine:    Adri Delarosa is a 40 year old female with PMHx of alcohol abuse, anxiety, depression admitted on 10/8/2021 to inpatient psychiatry for alcohol detox.      #Alcohol abuse with withdrawal - Drinking 1L of alcohol daily. Alcohol level 0.207 on admission. Denies hx of DTs. Endorses a hx of seizures in setting of using Wellbutrin. PTA on naltrexone 50 mg daily.   - Agree with MSSA protocol, management per psychiatry   - Agree with Thiamine, folic acid and MVI      #Anxiety, Depression - PTA medications with atarax 25-50 mg Q4H PRN, fluoxetine 60 mg daily, gabapentin 100 mg TID PRN anxiety    - Management per psychiatry      #Transaminitis - AST elevated at 74. Remaining hepatic panel WNL. Likely due to alcohol use. Recommend outpatient follow up with PCP in 1 week.      #Elevated blood pressure- 145/91, likely due to alcohol withdrawal.   - Please notify medicine if SBP >180 or DBP >110, can consider adding PRN clonidine  - Please notify medicine if BP >160/90 after completing alcohol detox          Hospital Course:   Adri Delarosa was admitted to Station [facility] with attending Jaciel Mccoy MD as a voluntary patient. The patient was placed under status 15 (15 minute checks) to ensure patient safety.   MSSA protocol was initiated due to the patient's history of alcohol abuse and concern for withdrawal symptoms.  CBC, BMP and utox obtained.    The patient had not been taking any medications as an outpatient. Prozac was restarted at 20mg Qday. Naltrexone was restarted after detox was completed.     Adri Delarsoa did participate in groups and was visible in the milieu.     The patient's symptoms of withdrawal improved.     Adri Delarosa was released to home. At the time of discharge Adri Delarosa was determined to not be a danger to herself or others. At the current time of discharge, the patient does not meet criteria for involuntary hospitalization. On the day of  discharge, the patient reports that they do not have suicidal or homicidal ideation and would never hurt themselves or others. Steps taken to minimize risk include: assessing patient s behavior and thought process daily during hospital stay, discharging patient with adequate plan for follow up for mental and physical health and discussing safety plan of returning to the hospital should the patient ever have thoughts of harming themselves or others. Therefore, based on all available evidence including the factors cited above, the patient does not appear to be at imminent risk for self-harm, and is appropriate for outpatient level of care.           Discharge Medications:     Current Discharge Medication List      CONTINUE these medications which have CHANGED    Details   FLUoxetine (PROZAC) 20 MG capsule Take 1 capsule (20 mg) by mouth daily  Qty: 30 capsule, Refills: 1    Associated Diagnoses: Current moderate episode of major depressive disorder, unspecified whether recurrent (H)      folic acid (FOLVITE) 1 MG tablet Take 1 tablet (1 mg) by mouth daily  Qty: 30 tablet, Refills: 1    Associated Diagnoses: Alcohol withdrawal syndrome with complication, with unspecified complication (H)      gabapentin (NEURONTIN) 100 MG capsule Take 1 capsule (100 mg) by mouth 3 times daily as needed (anxiety)  Qty: 90 capsule, Refills: 1    Associated Diagnoses: Current moderate episode of major depressive disorder, unspecified whether recurrent (H)      hydrOXYzine (ATARAX) 25 MG tablet Take 1-2 tablets (25-50 mg) by mouth every 4 hours as needed for anxiety  Qty: 90 tablet, Refills: 1    Associated Diagnoses: Current moderate episode of major depressive disorder, unspecified whether recurrent (H)      multivitamin w/minerals (THERA-VIT-M) tablet Take 1 tablet by mouth daily  Qty: 30 tablet, Refills: 1    Associated Diagnoses: Alcohol withdrawal syndrome with complication, with unspecified complication (H)      naltrexone  (DEPADE/REVIA) 50 MG tablet Take 1 tablet (50 mg) by mouth daily  Qty: 30 tablet, Refills: 3    Associated Diagnoses: Alcohol withdrawal syndrome with complication, with unspecified complication (H)         CONTINUE these medications which have NOT CHANGED    Details   thiamine (B-1) 100 MG tablet Take 1 tablet (100 mg) by mouth daily  Qty: 90 tablet, Refills: 1    Associated Diagnoses: Alcohol withdrawal syndrome with complication, with unspecified complication (H)                  Psychiatric Examination:   Appearance:  awake, alert and adequately groomed  Attitude:  cooperative  Eye Contact:  good  Mood:  good  Affect:  mood congruent  Speech:  clear, coherent  Psychomotor Behavior:  no evidence of tardive dyskinesia, dystonia, or tics  Thought Process:  goal oriented  Associations:  no loose associations  Thought Content:  no evidence of suicidal ideation or homicidal ideation and no evidence of psychotic thought  Insight:  fair  Judgment:  intact  Oriented to:  time, person, and place  Attention Span and Concentration:  intact  Recent and Remote Memory:  intact  Language: Able to read and write  Fund of Knowledge: appropriate  Muscle Strength and Tone: normal  Gait and Station: Normal         Discharge Plan:   Continue medications as above.     Primary Provider:  Shikha Guardado MD  [facility]  [phone]  [id number] [place] [address]  Please follow up with primary MD  within 30 days for post hospitalization checkup.        Major Treatments, Procedures and Findings:  You have withdrawn from alcohol using the MSSA protocol.   You have had labs drawn and those results have been reviewed with you. Please take a copy of your lab work with you to your next primary care physician appointment.     Symptoms to Report:  If you experience more anxiety, confusion, sleeplessness, deep sadness or thoughts of suicide, notify your treatment team or notify your primary care physician. IF ANY OF THE  SYMPTOMS YOU ARE EXPERIENCING ARE A MEDICAL EMERGENCY CALL 911 IMMEDIATELY.      Lifestyle Adjustment: Adjust your lifestyle to get enough sleep, relaxation, exercise and  good nutrition. Continue to develop healthy coping skills to decrease stress and promote a sober living environment. Do not use alcohol, illegal drugs or addictive medications other than what is currently prescribed. AA, NA, and  Sponsor are excellent resources for support.      General Medication Instructions:   See your medication sheet(s) for instructions.   Take all medicines as directed.  Make no changes unless your doctor suggests them.     Attestation:    The patient was seen and evaluated by me. I spent more than 30 minutes on discharge day activities. Jaciel Mccoy MD

## 2021-10-10 NOTE — DISCHARGE INSTRUCTIONS
Behavioral Discharge Planning and Instructions  THANK YOU FOR CHOOSING THE [facility]  3A  [phone]    Summary: You were admitted to [facility] on 10/8/21 for detoxification from alcohol.  A medical exam was performed that included lab work. You detox is complete you are returning to  your sober housing.  Please take care and make your recovery a priority!    Main Diagnosis: Alcohol use disorder    Disposition: Discharge to sober housing.      Primary Provider:  Shikha Guardado MD  [facility]  [phone]  [address] [place] [address]  Please follow up with primary MD  within 30 days for post hospitalization checkup.      Major Treatments, Procedures and Findings:  You have withdrawn from alcohol using the MSSA protocol.   You have had labs drawn and those results have been reviewed with you. Please take a copy of your lab work with you to your next primary care physician appointment.    Symptoms to Report:  If you experience more anxiety, confusion, sleeplessness, deep sadness or thoughts of suicide, notify your treatment team or notify your primary care physician. IF ANY OF THE SYMPTOMS YOU ARE EXPERIENCING ARE A MEDICAL EMERGENCY CALL 911 IMMEDIATELY.     Lifestyle Adjustment: Adjust your lifestyle to get enough sleep, relaxation, exercise and  good nutrition. Continue to develop healthy coping skills to decrease stress and promote a sober living environment. Do not use alcohol, illegal drugs or addictive medications other than what is currently prescribed. AA, NA, and  Sponsor are excellent resources for support.     General Medication Instructions:   See your medication sheet(s) for instructions.   Take all medicines as directed.  Make no changes unless your doctor suggests them.       DISCHARGE RESOURCES:  Facts about COVID19 at www.cdc.gov/COVID19 and www.[place].gov/covid19    Keeping hands clean is one of the most important steps we can take to avoid getting sick  "and spreading germs to others.  Please wash your hands frequently and lather with soap for at least 20 seconds!    Recovery apps for your phone to locate current in person and zoom recovery meetings  [employer] - meeting teresa  AA  - meeting teresa  Meeting guide - meeting teresa  Quick NA meeting - meeting teresa  Marcia- has various apps    Great Pod casts for nutrition and wellness  Listen on Apple Podcasts  Dishing Up Nutrition   [employer]   Nutrition       Understand the connection between what you eat and how you feel. Hosted by licensed nutritionists and dietitians from [employer] we share practical, real-life solutions for healthier living through nutrition.     Resources:   Resources for on line recovery meetings:  *due to covid-19 AA/NA meetings are being held online*  AA meetings can be found online; search for them at: http://aa-intergroup.org/directory.php  AA meetings via ZOOM for [place] area can be found online at: https://aaPred[place].org/find-a-meeting/holiday-closings/  NA meetings via ZOOM for [place] area can be found online at: https://sites.Fetchnotes.com/view/[place]regionofnarcoticsanonymous/home?authuser=2  Www.[employer]  has online resources for meeting and recovery care including Podcast \"Let's Talk:Addiction & Recovery Podcasts  Www.BlogBus.org   -SMART Recovery - self management for addiction recovery:  www.smartrecLattice Power.org    -Pathways ~ A Health Crisis Resource & Support Center: 947.951.9508.  -[facility] [phone]   -[facility] Behavioral Intake 548-453-1398 or 013-801-3783.  -Suicide Awareness Voices of Education (SAVE) (www.save.org): 230-426-LHSD (6701)  -National Suicide Prevention Line (www.mentalhealthmn.org): 178-006-EAWB (8035)  -National [place] on Mental Illness (www.[place].niecy.org): 376.363.3757 or 118-391-1635.  -Uydj0ladi: text the word LIFE to 55114 for immediate support and crisis " intervention  -Mental Health Consumer/Survivor Network of [place] (www.mhcsn.net): 760.698.9348 or 231-163-0589  -Mental Health Association of [place] (www.mentalhealth.org): 678.637.5906 or 554-974-4269     -Substance Abuse and Mental Health Services (www.samhsa.gov)  -Harm Reduction Coalition (www. Harmreduction.org)  -www.prescribetoprevent.org or http://prescribetoprevent.org/video  -Poison control 6-850-596-4975   **[place] Opioid Prevention Coalition: www.opioidcoalition.org    Sober Support Group Information:  AA/NA & Sponsor/Support  -Alcoholics Anonymous (www.alcoholics-anonymous.org): for local information 24 hours/day  -AA Intergroup service office in [place] (http://www.aastpaul.org/) 749.244.3407  -AA Intergroup service office in [facility]: 796.139.2049. (http://www.aamin[place].org/)  -Narcotics Anonymous (www.naminn[place].org) (856) 175-9439   **Sober Fun Activities: www.soberIGI LABORATORIESactivities.[employer]/[facility]//[facility] Recovery Connection (MRC)  [facility] connects people seeking recovery to resources that help foster and sustain long-term recovery.  Whether you are seeking resources for treatment, transportation, housing, job training, education, health care or other pathways to recovery, [facility] is a great place to start.    Phone: 599.109.1364.  www.minnesotamodulR.org (Great listing of all types of recovery and non-recovery related resources)    Any follow up concerns:  Nursing questions call the [facility] [phone]  Medical Record call [phone]  Outpatient Behavioral Intake call [phone]  LP+ Wait List/Bed Availability call [phone]    The entire treatment team has appreciated the opportunity to work with you.  We wish you the best in the future and with your lifelong recovery goals. Please bring this discharge folder with you to all follow up appointments.  It contains your lab results, diagnosis, medication list and discharge recommendations.    THANK YOU FOR  CHOOSING THE [facility]

## 2021-10-10 NOTE — PLAN OF CARE
Pt verbalized complete understanding of all discharge instructions. Pt discharged to home transported by her boyfriend.     Estimated Blood Loss (Cc): minimal

## 2022-02-11 ENCOUNTER — HOSPITAL ENCOUNTER (INPATIENT)
Facility: CLINIC | Age: 42
LOS: 2 days | Discharge: HOME | End: 2022-02-13
Attending: FAMILY MEDICINE | Admitting: PSYCHIATRY & NEUROLOGY
Payer: COMMERCIAL

## 2022-02-11 DIAGNOSIS — F19.10 POLYSUBSTANCE ABUSE (H): ICD-10-CM

## 2022-02-11 DIAGNOSIS — F10.29 ALCOHOL DEPENDENCE WITH UNSPECIFIED ALCOHOL-INDUCED DISORDER (H): ICD-10-CM

## 2022-02-11 LAB
ALBUMIN SERPL-MCNC: 3 G/DL (ref 3.4–5)
ALCOHOL BREATH TEST: 0 (ref 0–0.01)
ALP SERPL-CCNC: 86 U/L (ref 40–150)
ALT SERPL W P-5'-P-CCNC: 25 U/L (ref 0–50)
ANION GAP SERPL CALCULATED.3IONS-SCNC: 6 MMOL/L (ref 3–14)
AST SERPL W P-5'-P-CCNC: 22 U/L (ref 0–45)
BASOPHILS # BLD AUTO: 0.1 10E3/UL (ref 0–0.2)
BASOPHILS NFR BLD AUTO: 1 %
BILIRUB SERPL-MCNC: 0.3 MG/DL (ref 0.2–1.3)
BUN SERPL-MCNC: 2 MG/DL (ref 7–30)
CALCIUM SERPL-MCNC: 9.2 MG/DL (ref 8.5–10.1)
CHLORIDE BLD-SCNC: 105 MMOL/L (ref 94–109)
CO2 SERPL-SCNC: 29 MMOL/L (ref 20–32)
CREAT SERPL-MCNC: 0.64 MG/DL (ref 0.52–1.04)
EOSINOPHIL # BLD AUTO: 0.6 10E3/UL (ref 0–0.7)
EOSINOPHIL NFR BLD AUTO: 6 %
ERYTHROCYTE [DISTWIDTH] IN BLOOD BY AUTOMATED COUNT: 13.3 % (ref 10–15)
ETHANOL SERPL-MCNC: <0.01 G/DL
GFR SERPL CREATININE-BSD FRML MDRD: >90 ML/MIN/1.73M2
GLUCOSE BLD-MCNC: 78 MG/DL (ref 70–99)
HCT VFR BLD AUTO: 36.6 % (ref 35–47)
HGB BLD-MCNC: 12.1 G/DL (ref 11.7–15.7)
IMM GRANULOCYTES # BLD: 0 10E3/UL
IMM GRANULOCYTES NFR BLD: 0 %
LIPASE SERPL-CCNC: 84 U/L (ref 73–393)
LYMPHOCYTES # BLD AUTO: 3.1 10E3/UL (ref 0.8–5.3)
LYMPHOCYTES NFR BLD AUTO: 29 %
MCH RBC QN AUTO: 32.2 PG (ref 26.5–33)
MCHC RBC AUTO-ENTMCNC: 33.1 G/DL (ref 31.5–36.5)
MCV RBC AUTO: 97 FL (ref 78–100)
MONOCYTES # BLD AUTO: 0.7 10E3/UL (ref 0–1.3)
MONOCYTES NFR BLD AUTO: 7 %
NEUTROPHILS # BLD AUTO: 6 10E3/UL (ref 1.6–8.3)
NEUTROPHILS NFR BLD AUTO: 57 %
NRBC # BLD AUTO: 0 10E3/UL
NRBC BLD AUTO-RTO: 0 /100
PLATELET # BLD AUTO: 419 10E3/UL (ref 150–450)
POTASSIUM BLD-SCNC: 3.1 MMOL/L (ref 3.4–5.3)
PROT SERPL-MCNC: 7.4 G/DL (ref 6.8–8.8)
RBC # BLD AUTO: 3.76 10E6/UL (ref 3.8–5.2)
SARS-COV-2 RNA RESP QL NAA+PROBE: NEGATIVE
SODIUM SERPL-SCNC: 140 MMOL/L (ref 133–144)
WBC # BLD AUTO: 10.5 10E3/UL (ref 4–11)

## 2022-02-11 PROCEDURE — U0003 INFECTIOUS AGENT DETECTION BY NUCLEIC ACID (DNA OR RNA); SEVERE ACUTE RESPIRATORY SYNDROME CORONAVIRUS 2 (SARS-COV-2) (CORONAVIRUS DISEASE [COVID-19]), AMPLIFIED PROBE TECHNIQUE, MAKING USE OF HIGH THROUGHPUT TECHNOLOGIES AS DESCRIBED BY CMS-2020-01-R: HCPCS | Performed by: FAMILY MEDICINE

## 2022-02-11 PROCEDURE — 128N000004 HC R&B CD ADULT

## 2022-02-11 PROCEDURE — 250N000013 HC RX MED GY IP 250 OP 250 PS 637: Performed by: FAMILY MEDICINE

## 2022-02-11 PROCEDURE — 83690 ASSAY OF LIPASE: CPT | Performed by: FAMILY MEDICINE

## 2022-02-11 PROCEDURE — 250N000013 HC RX MED GY IP 250 OP 250 PS 637: Performed by: PSYCHIATRY & NEUROLOGY

## 2022-02-11 PROCEDURE — 85025 COMPLETE CBC W/AUTO DIFF WBC: CPT | Performed by: FAMILY MEDICINE

## 2022-02-11 PROCEDURE — 99285 EMERGENCY DEPT VISIT HI MDM: CPT | Performed by: FAMILY MEDICINE

## 2022-02-11 PROCEDURE — 80053 COMPREHEN METABOLIC PANEL: CPT | Performed by: FAMILY MEDICINE

## 2022-02-11 PROCEDURE — 82077 ASSAY SPEC XCP UR&BREATH IA: CPT | Performed by: FAMILY MEDICINE

## 2022-02-11 PROCEDURE — 36415 COLL VENOUS BLD VENIPUNCTURE: CPT | Performed by: FAMILY MEDICINE

## 2022-02-11 PROCEDURE — 82075 ASSAY OF BREATH ETHANOL: CPT | Performed by: FAMILY MEDICINE

## 2022-02-11 PROCEDURE — 93005 ELECTROCARDIOGRAM TRACING: CPT | Performed by: FAMILY MEDICINE

## 2022-02-11 PROCEDURE — 99284 EMERGENCY DEPT VISIT MOD MDM: CPT | Performed by: FAMILY MEDICINE

## 2022-02-11 RX ORDER — DIAZEPAM 5 MG
5-20 TABLET ORAL EVERY 30 MIN PRN
Status: DISCONTINUED | OUTPATIENT
Start: 2022-02-11 | End: 2022-02-13 | Stop reason: HOSPADM

## 2022-02-11 RX ORDER — LANOLIN ALCOHOL/MO/W.PET/CERES
3 CREAM (GRAM) TOPICAL
Status: DISCONTINUED | OUTPATIENT
Start: 2022-02-11 | End: 2022-02-13 | Stop reason: HOSPADM

## 2022-02-11 RX ORDER — GABAPENTIN 100 MG/1
100 CAPSULE ORAL EVERY 6 HOURS PRN
Status: DISCONTINUED | OUTPATIENT
Start: 2022-02-11 | End: 2022-02-13 | Stop reason: HOSPADM

## 2022-02-11 RX ORDER — PHENOBARBITAL 32.4 MG/1
32.4 TABLET ORAL
Status: DISCONTINUED | OUTPATIENT
Start: 2022-02-11 | End: 2022-02-13 | Stop reason: HOSPADM

## 2022-02-11 RX ORDER — FOLIC ACID 1 MG/1
1 TABLET ORAL DAILY
Status: DISCONTINUED | OUTPATIENT
Start: 2022-02-12 | End: 2022-02-13 | Stop reason: HOSPADM

## 2022-02-11 RX ORDER — POTASSIUM CHLORIDE 1.5 G/1.58G
20 POWDER, FOR SOLUTION ORAL ONCE
Status: COMPLETED | OUTPATIENT
Start: 2022-02-11 | End: 2022-02-11

## 2022-02-11 RX ORDER — POTASSIUM CHLORIDE 1.5 G/1.58G
40 POWDER, FOR SOLUTION ORAL ONCE
Status: COMPLETED | OUTPATIENT
Start: 2022-02-11 | End: 2022-02-11

## 2022-02-11 RX ORDER — MULTIPLE VITAMINS W/ MINERALS TAB 9MG-400MCG
1 TAB ORAL DAILY
Status: DISCONTINUED | OUTPATIENT
Start: 2022-02-12 | End: 2022-02-13 | Stop reason: HOSPADM

## 2022-02-11 RX ORDER — MAGNESIUM HYDROXIDE/ALUMINUM HYDROXICE/SIMETHICONE 120; 1200; 1200 MG/30ML; MG/30ML; MG/30ML
30 SUSPENSION ORAL EVERY 4 HOURS PRN
Status: DISCONTINUED | OUTPATIENT
Start: 2022-02-11 | End: 2022-02-13 | Stop reason: HOSPADM

## 2022-02-11 RX ORDER — ATENOLOL 50 MG/1
50 TABLET ORAL DAILY PRN
Status: DISCONTINUED | OUTPATIENT
Start: 2022-02-11 | End: 2022-02-13 | Stop reason: HOSPADM

## 2022-02-11 RX ORDER — FLUOXETINE 10 MG/1
10 CAPSULE ORAL DAILY
Status: DISCONTINUED | OUTPATIENT
Start: 2022-02-12 | End: 2022-02-11

## 2022-02-11 RX ORDER — ACETAMINOPHEN 325 MG/1
650 TABLET ORAL EVERY 4 HOURS PRN
Status: DISCONTINUED | OUTPATIENT
Start: 2022-02-11 | End: 2022-02-13 | Stop reason: HOSPADM

## 2022-02-11 RX ORDER — PHENOBARBITAL 32.4 MG/1
32.4 TABLET ORAL
Status: DISCONTINUED | OUTPATIENT
Start: 2022-02-12 | End: 2022-02-11

## 2022-02-11 RX ORDER — FLUOXETINE 10 MG/1
10 CAPSULE ORAL DAILY
Status: DISCONTINUED | OUTPATIENT
Start: 2022-02-12 | End: 2022-02-12

## 2022-02-11 RX ADMIN — POTASSIUM CHLORIDE 20 MEQ: 1.5 FOR SOLUTION ORAL at 17:26

## 2022-02-11 RX ADMIN — POTASSIUM CHLORIDE 40 MEQ: 1.5 FOR SOLUTION ORAL at 16:57

## 2022-02-11 RX ADMIN — PHENOBARBITAL 32.4 MG: 32.4 TABLET ORAL at 22:39

## 2022-02-11 ASSESSMENT — ACTIVITIES OF DAILY LIVING (ADL)
HEARING_DIFFICULTY_OR_DEAF: NO
DIFFICULTY_COMMUNICATING: NO
DIFFICULTY_EATING/SWALLOWING: NO
WALKING_OR_CLIMBING_STAIRS_DIFFICULTY: NO
WALKING_OR_CLIMBING_STAIRS_DIFFICULTY: NO
HEARING_DIFFICULTY_OR_DEAF: NO
DRESSING/BATHING_DIFFICULTY: NO
DOING_ERRANDS_INDEPENDENTLY_DIFFICULTY: NO
FALL_HISTORY_WITHIN_LAST_SIX_MONTHS: NO
CONCENTRATING,_REMEMBERING_OR_MAKING_DECISIONS_DIFFICULTY: YES
PATIENT_/_FAMILY_COMMUNICATION_STYLE: SPOKEN LANGUAGE (ENGLISH OR BILINGUAL)
WEAR_GLASSES_OR_BLIND: YES
TOILETING_ISSUES: NO
DOING_ERRANDS_INDEPENDENTLY_DIFFICULTY: NO
VISION_MANAGEMENT: GLASSES
WEAR_GLASSES_OR_BLIND: YES
CONCENTRATING,_REMEMBERING_OR_MAKING_DECISIONS_DIFFICULTY: NO
FALL_HISTORY_WITHIN_LAST_SIX_MONTHS: NO
DRESSING/BATHING_DIFFICULTY: NO
DIFFICULTY_COMMUNICATING: NO
TOILETING_ISSUES: NO
DIFFICULTY_EATING/SWALLOWING: NO

## 2022-02-11 ASSESSMENT — MIFFLIN-ST. JEOR: SCORE: 1079.99

## 2022-02-11 NOTE — ED NOTES
Sign out Provider: Sowmya      Sign out Plan: Patient is in the queue for voluntary detox admission.  Labs to date unremarkable.  We are waiting to review her final diagnostic studies.      Reassessment: Labs show no significant metabolic abnormality      Disposition: plan for voluntary detox admit       Ahsan Clay MD  02/11/22 1499

## 2022-02-11 NOTE — ED PROVIDER NOTES
"     [facility] EMERGENCY DEPARTMENT ([facility])  2/11/22    History     Chief Complaint   Patient presents with     Addiction Problem     HPI  Adri Delarosa is a 41 year old female with PMH significant for alcohol use disorder, cocaine use, and depression who presents to the emergency Department seeking detox from alcohol.  Patient reports that she has been drinking 1 L of vodka daily for the past 5 years.  She states that within the last month she has been using cocaine and Xanax as well.  She endorses a couple milligrams of Xanax daily and a \"8 ball\" of cocaine in the last 3 days.  She denies any chest pain, but does note some arm pain following using cocaine.  Patient reports the last time she was sober was in 11/2021 when she went to treatment for a month.  Patient also reports significant anxiety and depression.  She endorses suicidal ideation in the past, but denies any current suicidal ideation.  She denies history of alcohol withdrawal seizures, but does report a history of seizures when taking Wellbutrin.  She endorses a history of DTs.    Past Medical History  Past Medical History:   Diagnosis Date     Alcohol abuse      Anxiety      Depressive disorder      Past Surgical History:   Procedure Laterality Date     GYN SURGERY      C secton and tubes tide     NO HISTORY OF SURGERY       FLUoxetine (PROZAC) 20 MG capsule  gabapentin (NEURONTIN) 100 MG capsule  hydrOXYzine (ATARAX) 25 MG tablet  multivitamin w/minerals (THERA-VIT-M) tablet  naltrexone (DEPADE/REVIA) 50 MG tablet  thiamine (B-1) 100 MG tablet  folic acid (FOLVITE) 1 MG tablet      No Known Allergies  Past medical history, past surgical history, medications, and allergies were reviewed with the patient. Additional pertinent items: None    Family History  Family History   Problem Relation Age of Onset     Alcoholism Maternal Grandmother      Family history was reviewed with the patient. Additional pertinent items: None    Social " History  Social History     Tobacco Use     Smoking status: Current Every Day Smoker     Packs/day: 0.50     Smokeless tobacco: Never Used   Substance Use Topics     Alcohol use: Yes     Drug use: Never      Social history was reviewed with the patient. Additional pertinent items: None      Review of Systems   Psychiatric/Behavioral: Negative for suicidal ideas.   All other systems reviewed and are negative.    A complete review of systems was performed with pertinent positives and negatives noted in the HPI, and all other systems negative.    Physical Exam   BP: (!) 152/106  Pulse: 93  Temp: 97.8  F (36.6  C)  Resp: 16  Weight: 47.4 kg (104 lb 8 oz)  SpO2: 100 %  Physical Exam  Constitutional:       General: She is not in acute distress.     Appearance: She is not diaphoretic.   HENT:      Head: Atraumatic.      Mouth/Throat:      Pharynx: No oropharyngeal exudate.   Eyes:      General: No scleral icterus.     Pupils: Pupils are equal, round, and reactive to light.   Cardiovascular:      Heart sounds: Normal heart sounds.   Pulmonary:      Effort: No respiratory distress.      Breath sounds: Normal breath sounds.   Abdominal:      General: Bowel sounds are normal.      Palpations: Abdomen is soft.      Tenderness: There is no abdominal tenderness.   Musculoskeletal:         General: No tenderness.   Skin:     General: Skin is warm.      Findings: No rash.   Neurological:      General: No focal deficit present.      Mental Status: She is oriented to person, place, and time.      Cranial Nerves: No cranial nerve deficit.      Sensory: No sensory deficit.      Motor: No weakness.      Coordination: Coordination normal.   Psychiatric:         Mood and Affect: Mood is anxious and depressed.         Speech: Speech is slurred.         Behavior: Behavior is cooperative.         Thought Content: Thought content does not include suicidal ideation.         ED Course      Procedures   1:52 PM  The patient was seen and examined  by Raimundo Deng MD in Room ED10.        The medical record was reviewed and interpreted.  Current labs reviewed and interpreted.      Suicide assessment completed by mental health (D.E.C., LCSW, etc.)       Results for orders placed or performed during the hospital encounter of 02/11/22 (from the past 48 hour(s))   Alcohol breath test POCT   Result Value Ref Range    Alcohol Breath Test 0.00 0.00 - 0.01   EKG 12-lead, tracing only   Result Value Ref Range    Systolic Blood Pressure  mmHg    Diastolic Blood Pressure  mmHg    Ventricular Rate 79 BPM    Atrial Rate 79 BPM    MA Interval 124 ms    QRS Duration 74 ms     ms    QTc 477 ms    P Axis 35 degrees    R AXIS 43 degrees    T Axis 37 degrees    Interpretation ECG Sinus rhythm  Normal ECG      [facility] Draw *Canceled*    Narrative    The following orders were created for panel order [facility] Draw.  Procedure                               Abnormality         Status                     ---------                               -----------         ------                       Please view results for these tests on the individual orders.   CBC with platelets differential    Narrative    The following orders were created for panel order CBC with platelets differential.  Procedure                               Abnormality         Status                     ---------                               -----------         ------                     CBC with platelets and d...[[id number]]  Abnormal            Final result                 Please view results for these tests on the individual orders.   Comprehensive metabolic panel   Result Value Ref Range    Sodium 140 133 - 144 mmol/L    Potassium 3.1 (L) 3.4 - 5.3 mmol/L    Chloride 105 94 - 109 mmol/L    Carbon Dioxide (CO2) 29 20 - 32 mmol/L    Anion Gap 6 3 - 14 mmol/L    Urea Nitrogen 2 (L) 7 - 30 mg/dL    Creatinine 0.64 0.52 - 1.04 mg/dL    Calcium 9.2 8.5 - 10.1 mg/dL    Glucose 78 70 - 99 mg/dL     Alkaline Phosphatase 86 40 - 150 U/L    AST 22 0 - 45 U/L    ALT 25 0 - 50 U/L    Protein Total 7.4 6.8 - 8.8 g/dL    Albumin 3.0 (L) 3.4 - 5.0 g/dL    Bilirubin Total 0.3 0.2 - 1.3 mg/dL    GFR Estimate >90 >60 mL/min/1.73m2   Lipase   Result Value Ref Range    Lipase 84 73 - 393 U/L   CBC with platelets and differential   Result Value Ref Range    WBC Count 10.5 4.0 - 11.0 10e3/uL    RBC Count 3.76 (L) 3.80 - 5.20 10e6/uL    Hemoglobin 12.1 11.7 - 15.7 g/dL    Hematocrit 36.6 35.0 - 47.0 %    MCV 97 78 - 100 fL    MCH 32.2 26.5 - 33.0 pg    MCHC 33.1 31.5 - 36.5 g/dL    RDW 13.3 10.0 - 15.0 %    Platelet Count 419 150 - 450 10e3/uL    % Neutrophils 57 %    % Lymphocytes 29 %    % Monocytes 7 %    % Eosinophils 6 %    % Basophils 1 %    % Immature Granulocytes 0 %    NRBCs per 100 WBC 0 <1 /100    Absolute Neutrophils 6.0 1.6 - 8.3 10e3/uL    Absolute Lymphocytes 3.1 0.8 - 5.3 10e3/uL    Absolute Monocytes 0.7 0.0 - 1.3 10e3/uL    Absolute Eosinophils 0.6 0.0 - 0.7 10e3/uL    Absolute Basophils 0.1 0.0 - 0.2 10e3/uL    Absolute Immature Granulocytes 0.0 <=0.4 10e3/uL    Absolute NRBCs 0.0 10e3/uL   Urine Drugs of Abuse Screen    Narrative    The following orders were created for panel order Urine Drugs of Abuse Screen.  Procedure                               Abnormality         Status                     ---------                               -----------         ------                     Drug abuse screen 1 urin...[[id number]]                                                   Please view results for these tests on the individual orders.       Medications   potassium chloride (KLOR-CON) Packet 40 mEq (has no administration in time range)        Assessments & Plan (with Medical Decision Making)       I have reviewed the nursing notes. I have reviewed the findings, diagnosis, plan and need for follow up with the patient.    Patient with alcohol dependence and polysubstance abuse at this time will be admitted to  station 3 a for medically supervised detox and treatment.    Final diagnoses:   Alcohol dependence with unspecified alcohol-induced disorder (H)   Polysubstance abuse (H)     I, Alonso Thomas, am serving as a trained medical scribe to document services personally performed by Raimundo Deng MD, based on the provider's statements to me.     IRaimundo MD, was physically present and have reviewed and verified the accuracy of this note documented by Alonso Thomas.    --  Raimundo Deng MD  [facility] EMERGENCY DEPARTMENT  2/11/2022     Raimundo Deng MD  02/11/22 9633

## 2022-02-11 NOTE — ED NOTES
ED to Behavioral Floor Handoff    SITUATION  Adri Delarosa is a 41 year old female who speaks English and lives in a home alone The patient arrived in the ED by private car from home with a complaint of Addiction Problem  .The patient's current symptoms started/worsened 1 month(s) ago and during this time the symptoms have increased.   In the ED, pt was diagnosed with   Final diagnoses:   Alcohol dependence with unspecified alcohol-induced disorder (H)   Polysubstance abuse (H)        Initial vitals were: BP: (!) 152/106  Pulse: 93  Temp: 97.8  F (36.6  C)  Resp: 16  Weight: 47.4 kg (104 lb 8 oz)  SpO2: 100 %   --------  Is the patient diabetic? No   If yes, last blood glucose? --     If yes, was this treated in the ED? --  --------  Is the patient inebriated (ETOH) No or Impaired on other substances? Yes  MSSA done? Yes  Last MSSA score: --    Were withdrawal symptoms treated? N/A  Does the patient have a seizure history? Yes. If yes, date of most recent seizure--  --------  Is the patient patient experiencing suicidal ideation? denies current or recent suicidal ideation     Homicidal ideation? denies current or recent homicidal ideation or behaviors.    Self-injurious behavior/urges? denies current or recent self injurious behavior or ideation.  ------  Was pt aggressive in the ED No  Was a code called No  Is the pt now cooperative? Yes  -------  Meds given in ED:   Medications   potassium chloride (KLOR-CON) Packet 40 mEq (40 mEq Oral Given 2/11/22 1657)   potassium chloride (KLOR-CON) Packet 20 mEq (20 mEq Oral Given 2/11/22 1726)      Family present during ED course? Yes  Family currently present? Yes    BACKGROUND  Does the patient have a cognitive impairment or developmental disability? No  Allergies: No Known Allergies.   Social demographics are   Social History     Socioeconomic History     Marital status:      Spouse name: None     Number of children: None     Years of education: None      Highest education level: None   Occupational History     None   Tobacco Use     Smoking status: Current Every Day Smoker     Packs/day: 0.50     Smokeless tobacco: Never Used   Substance and Sexual Activity     Alcohol use: Yes     Drug use: Never     Sexual activity: Yes     Partners: Male     Birth control/protection: Female Surgical   Other Topics Concern     None   Social History Narrative     None     Social Determinants of Health     Financial Resource Strain: Not on file   Food Insecurity: Not on file   Transportation Needs: Not on file   Physical Activity: Not on file   Stress: Not on file   Social Connections: Not on file   Intimate Partner Violence: Not on file   Housing Stability: Not on file        ASSESSMENT  Labs results   Labs Ordered and Resulted from Time of ED Arrival to Time of ED Departure   COMPREHENSIVE METABOLIC PANEL - Abnormal       Result Value    Sodium 140      Potassium 3.1 (*)     Chloride 105      Carbon Dioxide (CO2) 29      Anion Gap 6      Urea Nitrogen 2 (*)     Creatinine 0.64      Calcium 9.2      Glucose 78      Alkaline Phosphatase 86      AST 22      ALT 25      Protein Total 7.4      Albumin 3.0 (*)     Bilirubin Total 0.3      GFR Estimate >90     CBC WITH PLATELETS AND DIFFERENTIAL - Abnormal    WBC Count 10.5      RBC Count 3.76 (*)     Hemoglobin 12.1      Hematocrit 36.6      MCV 97      MCH 32.2      MCHC 33.1      RDW 13.3      Platelet Count 419      % Neutrophils 57      % Lymphocytes 29      % Monocytes 7      % Eosinophils 6      % Basophils 1      % Immature Granulocytes 0      NRBCs per 100 WBC 0      Absolute Neutrophils 6.0      Absolute Lymphocytes 3.1      Absolute Monocytes 0.7      Absolute Eosinophils 0.6      Absolute Basophils 0.1      Absolute Immature Granulocytes 0.0      Absolute NRBCs 0.0     LIPASE - Normal    Lipase 84     ALCOHOL BREATH TEST POCT - Normal    Alcohol Breath Test 0.00     HCG QUALITATIVE URINE   DRUG ABUSE SCREEN 1 URINE (ED)    COVID-19 VIRUS (CORONAVIRUS) BY PCR   ETHYL ALCOHOL LEVEL      Imaging Studies: No results found for this or any previous visit (from the past 24 hour(s)).   Most recent vital signs BP (!) 137/94   Pulse 93   Temp 97.8  F (36.6  C) (Oral)   Resp 16   Wt 47.4 kg (104 lb 8 oz)   LMP  (Approximate)   SpO2 100%   Breastfeeding No   BMI 19.11 kg/m     Abnormal labs/tests/findings requiring intervention:---   Pain control: pt had none  Nausea control: pt had none    RECOMMENDATION  Are any infection precautions needed (MRSA, VRE, etc.)? No If yes, what infection? --  ---  Does the patient have mobility issues? independently. If yes, what device does the pt use? ---  ---  Is patient on 72 hour hold or commitment? No If on 72 hour hold, have hold and rights been given to patient? N/A  Are admitting orders written if after 10 p.m. ?N/A  Tasks needing to be completed:---     Gina Calloway, RN    [phone] [place] ED   [phone] [facility] ED

## 2022-02-11 NOTE — ED TRIAGE NOTES
"Pt uses alcohol, cocaine, xanax and marijuana.  Pt has been drinking for 5 years and started doing drugs the last month.  Pt states, \"I drink a big bottle of vodka a day.\" Pt was here in August for detox.   "

## 2022-02-12 LAB
ATRIAL RATE - MUSE: 79 BPM
DIASTOLIC BLOOD PRESSURE - MUSE: NORMAL MMHG
INTERPRETATION ECG - MUSE: NORMAL
P AXIS - MUSE: 35 DEGREES
PR INTERVAL - MUSE: 124 MS
QRS DURATION - MUSE: 74 MS
QT - MUSE: 416 MS
QTC - MUSE: 477 MS
R AXIS - MUSE: 43 DEGREES
SYSTOLIC BLOOD PRESSURE - MUSE: NORMAL MMHG
T AXIS - MUSE: 37 DEGREES
VENTRICULAR RATE- MUSE: 79 BPM

## 2022-02-12 PROCEDURE — HZ2ZZZZ DETOXIFICATION SERVICES FOR SUBSTANCE ABUSE TREATMENT: ICD-10-PCS | Performed by: PSYCHIATRY & NEUROLOGY

## 2022-02-12 PROCEDURE — 99222 1ST HOSP IP/OBS MODERATE 55: CPT | Mod: AI | Performed by: PSYCHIATRY & NEUROLOGY

## 2022-02-12 PROCEDURE — 250N000013 HC RX MED GY IP 250 OP 250 PS 637: Performed by: PSYCHIATRY & NEUROLOGY

## 2022-02-12 PROCEDURE — 128N000004 HC R&B CD ADULT

## 2022-02-12 RX ORDER — ATOMOXETINE 40 MG/1
80 CAPSULE ORAL DAILY
COMMUNITY
End: 2023-01-09

## 2022-02-12 RX ORDER — NALTREXONE HYDROCHLORIDE 50 MG/1
100 TABLET, FILM COATED ORAL DAILY
COMMUNITY

## 2022-02-12 RX ADMIN — FOLIC ACID 1 MG: 1 TABLET ORAL at 12:06

## 2022-02-12 RX ADMIN — Medication 1 TABLET: at 12:06

## 2022-02-12 RX ADMIN — THIAMINE HCL TAB 100 MG 100 MG: 100 TAB at 12:07

## 2022-02-12 RX ADMIN — PHENOBARBITAL 32.4 MG: 32.4 TABLET ORAL at 20:27

## 2022-02-12 RX ADMIN — PHENOBARBITAL 32.4 MG: 32.4 TABLET ORAL at 16:51

## 2022-02-12 RX ADMIN — FLUOXETINE HYDROCHLORIDE 10 MG: 10 CAPSULE ORAL at 12:06

## 2022-02-12 RX ADMIN — PHENOBARBITAL 32.4 MG: 32.4 TABLET ORAL at 12:06

## 2022-02-12 ASSESSMENT — ACTIVITIES OF DAILY LIVING (ADL)
ORAL_HYGIENE: INDEPENDENT
HYGIENE/GROOMING: INDEPENDENT
DRESS: INDEPENDENT

## 2022-02-12 NOTE — PROGRESS NOTES
02/11/22 2229   Patient Belongings   Did you bring any home meds/supplements to the hospital?  Yes   Disposition of meds  Sent to security/pharmacy per site process;Other (see comment)   Patient Belongings locker;other (see comments)   Patient Belongings Put in Hospital Secure Location (Security or Locker, etc.) other (see comments)   Belongings Search Yes   Clothing Search Yes   Second Staff [employer]   Comment See Note   Storage Bin   2x totes w/personal supplies, books w/spiral and string, purse  Medical Room Bin  Cell phone, glasses case,  adapter & usb cords  Security Envelope   Nothing   A             Admission:  I am responsible for any personal items that are not sent to the safe or pharmacy.  [facility] is not responsible for loss, theft or damage of any property in my possession.  Signature:  _________________________________ Date: _______  Time: _____                                              Staff Signature:  ____________________________ Date: ________  Time: _____      2nd Staff person, if patient is unable/unwilling to sign:    Signature: ________________________________ Date: ________  Time: _____   Discharge:  [facility] has returned all of my personal belongings:  Signature: _________________________________ Date: ________  Time: _____                                          Staff Signature:  ____________________________ Date: ________  Time: _____

## 2022-02-12 NOTE — PLAN OF CARE
"Pt admitted to  for detox. Pt reports using xanax, cocaine, and marijuana. Pt reports drinking alcohol for the last 5 years. Reports drinking \"up to a bottle\" of alcohol daily. Pt's breathalyzer was 0. In the last month pt states she has also been using cocaine and xanax. Pt reports taking xanax \"2 mg every few days\". Pt later said that she bought 5 bars of xanax and has been taking it over the past month. Pt reports last use was 2/11/22 at 11am. Pt also reports using cocaine over the last month almost daily. States she has used an \"8 ball\" of cocaine in the last three days. Reports last use was 2/11/22 at 10am.     Pt denied seizures/DTs related to alcohol withdrawal. Pt did state she had seizures in the past while pregnant and on Wellbutrin. Pt stated her last seizure was in 2018.     Pt denied SI/SIB/HI. Pt was restless but cooperative with the interview process. Pt on MSSA with valium protocol.  Pt scored a MSSA of 5 on unit; no PRN indicated. Pt was steady on her feet and denied withdrawal symptoms. Pt on phenobarbital taper; received first dose. Pt currently resting in room. Will update receiving RN.   "

## 2022-02-12 NOTE — PLAN OF CARE
Problem: Alcohol Withdrawal  Goal: Alcohol Withdrawal Symptom Control  Outcome: No Change   Continues in detox status on alcohol withdrawal protocol. MSSA scores 2 and 5. Appetite fair. Fluids encouraged. Spent most of the day in bed. Refusing detox assessments. Irritable. Denies thoughts of self harm.

## 2022-02-12 NOTE — PROGRESS NOTES
Brief Medicine Note    Multiple attempts to see patient today for consult, patient declined.    Will attempt visit 2/13/22.      Tobi Gamble PA-C  Internal Medicine [facility] Hospitalist  [facility]  Pager [phone]

## 2022-02-13 VITALS
HEART RATE: 66 BPM | TEMPERATURE: 97.3 F | OXYGEN SATURATION: 98 % | DIASTOLIC BLOOD PRESSURE: 87 MMHG | SYSTOLIC BLOOD PRESSURE: 136 MMHG | WEIGHT: 104 LBS | RESPIRATION RATE: 16 BRPM | HEIGHT: 62 IN | BODY MASS INDEX: 19.14 KG/M2

## 2022-02-13 PROCEDURE — 250N000013 HC RX MED GY IP 250 OP 250 PS 637: Performed by: PSYCHIATRY & NEUROLOGY

## 2022-02-13 RX ADMIN — Medication 1 TABLET: at 08:50

## 2022-02-13 RX ADMIN — THIAMINE HCL TAB 100 MG 100 MG: 100 TAB at 08:50

## 2022-02-13 RX ADMIN — FOLIC ACID 1 MG: 1 TABLET ORAL at 08:50

## 2022-02-13 RX ADMIN — FLUOXETINE 20 MG: 20 CAPSULE ORAL at 08:50

## 2022-02-13 NOTE — PROGRESS NOTES
Writer met with patient to discuss aftercare planning. She indicates that her Father has been working on getting her into treatment programs, and that she had a recent Rule 25 done at [facility]. Writer verified that [facility] does not do Rule 25 assessments. Patient refused to sign GEOVANNA for her father, as writer offered to provide assistance in finding a treatment center, and to verify that he is able to pick her up today and that she would be going to a safe/stable environment. Patient also refused to sign GEOVANNA for her father when her nurse asked multiple times. States her father will come pick her up after discharge.

## 2022-02-13 NOTE — PLAN OF CARE
Pt's  MSSA was 5. She does not believe she needs to continue her phenobarbital taper. She is requesting to go home tomorrow.She talked to her Dad, she did not want to sign a GEOVANNA. She told writer that she has a plan with her Dad regarding her treatment.

## 2022-02-13 NOTE — PROGRESS NOTES
PMPD Note-    Narx Scores  Narcotic  020  Sedative  040  Stimulant  000  Explanation and Guidance  Overdose Risk Score  110  (Range 000-999)  Explanation and Guidance  State Indicators (0)  Details  RX Graph   Narcotic   Buprenorphine   Sedative   Stimulant   Other  Learn how to use graph  All Prescribers  Prescribers  2 - Jaciel Mccoy  1 - Linda Espinoza  Timeline  02/13  2m  6m  1y  2y  Disclaimer  Morphine Milligram Equivalent Prescribed Over Time  Last 30 Days  Last 60 Days  Last 90 Days  Last 1 Year  Last 2 Years  MME  Timeframe  0  1  2  1/15/22  1/30/22  2/13/22  0  MME per Day Avg.  0  MME per RX  Disclaimer  Lorazepam MgEq (LME) Prescribed Over Time  Last 30 Days  Last 60 Days  Last 90 Days  Last 1 Year  Last 2 Years  LME  Timeframe  0  1  2  1/15/22  1/30/22  2/13/22  0  LME Per Day Avg.  0  LME mg Per Rx  Disclaimer  Buprenorphine (mg) Prescribed Over Time  Last 30 Days  Last 60 Days  Last 90 Days  Last 1 Year  Last 2 Years  mg  Timeframe  0  1  2  1/15/22  1/30/22  2/13/22  0  mg Per Day Avg.  0  Avg mg Per Rx  Disclaimer  RX Summary  Summary  Total Prescriptions 4  Total Private Pay 0  Total Prescribers 2  Total Pharmacies 3  Opioids* (excluding Buprenorphine)  Current Qty 0  Current MME/day 0.00  30 Day Avg MME/day 0.00  Buprenorphine*  Current Qty 0  Current mg/day 0.00  30 Day Avg mg/day 0.00  RX Summary Expanded  Narcotics (excluding Buprenorphine)  30 Day Avg. MME 0.00  90 Day Avg. MME 0.00  Rx Count/12 Months 0  Prescriber #/6 Months 0  Pharmacy #/6 Months 0  Current Quantity 0  Buprenorphine  30 Day Avg. mg/day 0.00  90 Day Avg. mg/day 0.00  Rx Count/12 Months 0  Prescriber #/6 Months 0  Pharmacy #/6 Months 0  Current Quantity 0  Sedatives  30 Day Avg. LME 0.00  90 Day Avg. LME 0.00  Rx Count/12 Months 1  Prescriber #/6 Months 0  Pharmacy #/6 Months 0  Current Quantity 0  Stimulants  30 Day Avg. mg/day 0.00  90 Day Avg. mg/day 0.00  Rx Count/12 Months 0  Prescriber #/6 Months 0  Pharmacy #/6  Months 0  Current Quantity 0  Prescriptions  Total: 4  Private Pay: 0  Showing 1-4 of 4 Items View 15 Items  1 of 1   Filled  Written  Sold  ID  Drug  QTY  Days  Prescriber  RX #  Dispenser  Refill  Daily Dose*  Pymt Type     10/10/2021 10/10/2021 10/12/2021 1   Gabapentin 100 Mg Capsule  90.00 [address] [id number] Jonathan (1359) 0/1  Medicaid [place]  06/30/2021 06/30/2021  3   Gabapentin 100 Mg Capsule  42.00 14 Me Sim [id number] Gen (0831) 0/0  [place]  06/16/2021 06/16/2021  3   Gabapentin 100 Mg Capsule  21.00 7 Me Sim [id number] Gen (0831) 0/0  [place]  06/11/2021 06/11/2021 06/11/2021 2   Diazepam 10 Mg Tablet  9.00 3 Me Sim [id number] [facility] (0646) 0/0 3.00 LME Medicaid [place]  Disclaimer  Showing 1-4 of 4 Items View 15 Items  1 of 1   Providers  Total: 2  Showing 1-2 of 2 Items View 15 Items  1 of 1   Name  Address  City  State  Zipcode  Phone   Jaciel Mccoy [address] [facility] [phone] [phone]  Linda Espinoza [address] [place] [id number] [phone]  Showing 1-2 of 2 Items View 15 Items  1 of 1   Pharmacies  Total: 3  Showing 1-3 of 3 Items View 15 Items  1 of 1   Name  Address  City  State  Zipcode  Phone   [facility] [phone] [facility] [phone]  [employer] [phone] [facility] [id number] [phone]  [facility] [phone]) [address] [place] [address] [phone]  Showing 1-3 of 3 Items View 15 Items  1 of 1   The report provided is based upon the search criteria entered and the corresponding data as it has been reported by dispenser(s). If erroneous information is identified or additional information is needed, please contact the dispenser or the prescriber provided on the report. Date Sold signifies the date the prescription was sold (left the pharmacy). The absence of Date Sold does not necessarily indicate the prescription was not dispensed. Fill Date represents the date the medication was filled or  prepared by the pharmacy. Note, federal regulation (CFR Title 42: Part 2) requires patient consent prior to releasing certain patient data from federally funded opioid treatment programs (OTPs). As such, controlled substances dispensed from OTPs for medication-assisted treatment may not appear in the [place]  report. Morphine milligram equivalent (MME) conversion factors published by the CDC are used in the MME calculation. Per the CDC, the MME conversion factor is intended only for analytic purposes where prescription data are used to retrospectively calculate daily MME to inform analyses of risks associated with opioid prescribing. This value does not constitute clinical guidance or recommendations for converting patients from one form of opioid analgesic to another. Per the CDC, the conversion factors for drugs prescribed or provided, as part of medication-assisted treatment for opioid use disorder should not be used to benchmark against MME dosage thresholds meant for opioids prescribed for pain. Buprenorphine products listed in the CDC s MME file do not have an associated conversion factor. Lastly, the CDC notes, in clinical practice, calculating MME for methadone often involves a sliding-scale approach, whereby the conversion factor increases with increasing dose. The conversion factor of 3 for methadone presented in this file could underestimate MME for a given patient. This report contains confidential information, including patient identifiers, and is not a public record. The information on this report must be treated as protected health information and is only to be disclosed to others as authorized by applicable state and Federal regulations.

## 2022-02-13 NOTE — DISCHARGE SUMMARY
Service Date: 02/13/2022  Discharge Date: 02/13/2022    HISTORY:  Ms. Delarosa is a 41-year-old single white mother of 3 with a long history of alcohol abuse, some Xanax abuse, who brought herself to the hospital seeking detoxification from alcohol.  She was admitted to station 3A and was seen here by Dr. Montana on 02/12/2022.    The history is remarkable for alcohol dependency with high tolerance to alcohol, history of delirium tremens and no withdrawal seizures; however, she had Wellbutrin induced seizures in the past x 3.  She had 5 chemical dependency treatments in the past and her longest sobriety was 2-1/2 years.  She had a Rule 25 evaluation at [facility] and was advised to enter chemical dependency treatment at [facility].    The patient also has a history of unspecified depression and ADHD as well as posttraumatic stress disorder.  She has never had any psychotic manifestations.    HOSPITAL COURSE:  The patient's withdrawal period was unremarkable and she actually did not have any physical symptoms of withdrawal and her highest CIWA score was 6.  She had no Valium throughout her hospital stay and currently shows no signs of withdrawal.    Her laboratory studies were remarkable for decreased potassium of 3.1, low BUN of 2 and low albumin of 3 and otherwise was normal.  Her CBC was only remarkable for slightly decreased RBC count of 3.76 and otherwise was normal.  Her SARS-CoV-2 PCR was negative upon admission.  Her blood alcohol level was negative.  Her tox screen was also negative.    MENTAL STATUS UPON DISCHARGE:  Revealed a normally built and normally developed, very pleasant, friendly and cooperative with the interview 41-year-old white female, appearing about her stated age.  She was alert and oriented x 3.  Her speech was coherent, logical, and goal directed.  She showed no objective signs of depression and denied being depressed.  She adamantly denies suicidal ideation or intent.  No overt psychotic  features were noted or elicited.  The patient had pretty good insight into her problem and was planning to enter MI/CD treatment shortly after the discharge.  Her judgment did not seem to be significantly impaired.    DISCHARGE DIAGNOSES:  1.  Alcohol use disorder.  2.  Depression, not otherwise specified, in partial remission.  3.  Anxiety disorder, not otherwise specified.  4.  Post traumatic stress disorder by history.  5.  Benzodiazepine abuse, episodic.    DISCHARGE MEDICATIONS:  Prozac 20 mg q.a.m.    PLAN:  The patient will be discharged today on current medications.  She did not need any prescriptions as she had a supply of Prozac and naltrexone at home.  She is to enter an inpatient chemical dependency treatment at [facility].      Beto Broderick MD        D: 2022   T: 2022   MT: [facility]    Name:     HECTOR PAUL  MRN:      [id number]        Account:      [id number]   :      1980           Service Date: 2022                                  Discharge Date: 2022     Document: [id number]

## 2022-02-13 NOTE — DISCHARGE INSTRUCTIONS
Behavioral Discharge Planning and Instructions  THANK YOU FOR CHOOSING THE [facility]  3A  [phone]    Summary: You were admitted to Station 3A on 2/11/22 for detoxification from ETOH. Detox complete. A medical exam was performed that included lab work. You have met with a  and opted to discharge home.  Please take care and make your recovery a priority, Adri!    Recommendation:  Complete a Rule 25 Assessment and enter/complete a MI/CD Treatment Program.     Main Diagnosis: Per Dr. Jaciel Mccoy MD  303.90 (F10.20) Alcohol Use Disorder Severe    Major Treatments, Procedures and Findings:  You were detoxed from alcohol with the Modified Selective Severity Protocol using Valium. You have met with a  to develop a treatment plan for discharge.  You have had labs drawn and a copy of those labs will be sent home with you.  Please bring your lab results with to your follow up doctor appointment.    Symptoms to Report:  If you experience more anxiety, confusion, sleeplessness, deep sadness or thoughts of suicide, notify your treatment team or notify your primary care physician. IF ANY OF THE SYMPTOMS YOU ARE EXPERIENCING ARE A MEDICAL EMERGENCY CALL 911 IMMEDIATELY.     Lifestyle Adjustment: Adjust your lifestyle to get enough sleep, relaxation, exercise and  good nutrition. Continue to develop healthy coping skills to decrease stress and promote a sober living environment. Do not use alcohol, illegal drugs or addictive medications other than what is currently prescribed. AA, NA, and  Sponsor are excellent resources for support.     Primary Provider:  [place] [address]    Disposition: home      Facts about COVID19 at www.cdc.gov/COVID19 and www.[place].gov/covid19    Keeping hands clean is one of the most important steps we can take to avoid getting sick and spreading germs to others.  Please wash your hands frequently and lather  "with soap for at least 20 seconds!    Follow-up Appointment:   Appointment Date/Time: Patient did not divulge any information about upcoming appointments.     Resources:     Resources for on line recovery meetings:      *due to covid-19 AA/NA meetings are being held online*      AA meetings can be found online; search for them at: http://aa-intergroup.org/directory.php  AA meetings via [place] for [place] area can be found online at: https://a[place].org/find-a-meeting/holiday-closings/  NA meetings via [place] for [place] area can be found online at: https://sites.[place].com/view/[place]regionofnarcoticsanonymous/home?authuser=2    Www.[employer]  has online resources for meeting and recovery care including Podcast \"Let's Talk:Addiction & Recovery Podcasts    Www.mnrecThe 3Doodler.org     DISCHARGE RESOURCES:  -SMART Recovery - self management for addiction recovery:  www.smartrecThe 3Doodler.org    -Pathways ~ A Health Crisis Resource & Support Center: 310.348.5467.  -[facility] [phone]   -[facility] 581-547-2464 or 445-468-2645.  -Suicide Awareness Voices of Education (SAVE) (www.save.org): 341-625-IKBO (1016)  -National Suicide Prevention Line (www.mentalhealthmn.org): 551-715-FVWI (4177)  -National Blair on Mental Illness (www.mn.niecy.org): 783.846.8452 or 126-593-1778.  -Hesf0fvrn: text the word LIFE to 78666 for immediate support and crisis intervention  -Mental Health Consumer/Survivor Network of [place] (www.mhcsn.net): 314.714.5343 or 008-303-9699  -Mental Health Association of [place] (www.mentalhealth.org): 856.718.8446 or 852-348-1332     -Substance Abuse and Mental Health Services (www.samhsa.gov)  -Harm Reduction Coalition (www. Harmreduction.org)  -www.prescribetoprevent.org or http://prescribetoprevent.org/video  -Poison control 5-875-426-2862   **[place] Opioid Prevention Coalition: www.opioidcoalition.org    Sober Support Group Information:  AA/NA & " Sponsor/Support  -Alcoholics Anonymous (www.alcoholics-anonymous.org): for local information 24 hours/day  -AA Intergroup service office in [place] (http://www.aastpaul.org/) 452.533.6440  -AA Intergroup service office in [facility]: [phone]. (http://www.a[place].org/)  -Narcotics Anonymous (www.naminn[place].org) (740) 258-4353   **Sober Fun Activities: www.soberWaremakersactivitiesBillGuard/[facility]//[facility] Recovery Connection ([facility])  [facility] connects people seeking recovery to resources that help foster and sustain long-term recovery.  Whether you are seeking resources for treatment, transportation, housing, job training, education, health care or other pathways to recovery, [facility] is a great place to start.    Phone: (787) 697-7605.  www.[place]Unda (Great listing of all types of recovery and non-recovery related resources)      General Medication Instructions:   See your medication sheet(s) for instructions.   Take all medicines as directed.  Make no changes unless your doctor suggests them.   Go to all your doctor visits.  Be sure to have all your required lab tests. This way, your medicines can be refilled on time.  Do not use any drugs not prescribed by your provider.  AA/NA and Sponsors are excellent resources for support  Avoid alcohol.    Any follow up concerns:  Nursing questions call the Unit [facility] [phone]  Medical Record call [phone]  Outpatient Behavioral Intake call [phone]  LP+ Wait List/Bed Availability call [phone]    The entire treatment team has appreciated the opportunity to work with kari Rush.  We wish you the best in the future and with your lifelong recovery goals. Please bring this discharge folder with you to all follow up appointments.  It contains your lab results, diagnosis, medication list and discharge recommendations.    THANK YOU FOR CHOOSING THE [facility]

## 2022-02-13 NOTE — PLAN OF CARE
Problem: Alcohol Withdrawal  Goal: Alcohol Withdrawal Symptom Control  Outcome: Completed     Patient has not required any valium for alcohol detox since >24 hh. All MSSA scores since that time have been less than 8. Pt is now removed from alcohol detox monitoring status.

## 2022-02-13 NOTE — PLAN OF CARE
Problem: Adult Inpatient Plan of Care  Goal: Optimal Comfort and Wellbeing  Outcome: No Change     Behavioral  Pt appeared sleeping comfortably overnight; breathing was even and unlabored.      Medical  Pt taken out of detox from alcohol.   No new medical concerns noted.

## 2022-02-13 NOTE — PLAN OF CARE
Problem: Substance Withdrawal  Goal: Substance Withdrawal  Description: Signs and symptoms of listed problems will be absent or manageable.  Outcome: Declining  Flowsheets (Taken 2/13/2022 1228)  Substance Withdrawal Assessed: all   Continues in detox status on benzodiazepine withdrawal protocol. MSSA scores 6 and 6. Patient refused scheduled Phenobarbital this AM. Patient states she used Xanex 4 times in the past month. Denies symptoms of withdrawal.

## 2022-02-13 NOTE — H&P
Admitted: 02/11/2022    The patient was seen for 55 minutes on 02/12/2022.  Greater than 50% of the time was spent on counseling, coordinating care, clarifying diagnostic and prognostic issues, and patient plans to maintain sobriety in community.    CHIEF COMPLAINT AND REASON FOR ADMISSION:  The patient is a 41-year-old female, who brought herself in for detoxification from alcohol.    HISTORY OF PRESENT ILLNESS:  The patient presents as pretty irritable, but at the same time appears to be open and providing fair information about her chemical use. Reports that she currently lives alone, which is not working well.  She has 3 kids, a daughter and 2 sons, who are currently living with their dads.  The patient is not working.  Reports that she has been averaging 1 pint of vodka per day.  When she tries to stop, she feels shaky and sweaty.  Reports that in the last month, she started using stimulants as well, such as cocaine.  The patient's urine drug screen was not done on this admission.  She says that she uses cocaine in binges, sometimes for 3 days in a row.  Reports that she also used Xanax as well, but uses it rarely.  However, when asked about more specific questions, said that she used it maybe 10 times in the last month and not more than 1 mg per day.    PAST PSYCHIATRIC HISTORY:  As above.  The patient reports classical symptoms of developing tolerance, also symptoms of withdrawal, spends an excessive amount of time on using and getting substance, and then recovering from prolonged use.  Reports history of DTs, but long time ago.  Said that she had 3 seizures, but those were not alcohol-related. Reports using multiple substances.  See above.  Said that she went through 5 chemical dependency treatment programs.  Last one of them was in 10/2021 called [place].  The patient is thinking about going into treatment now. Reports being diagnosed with depression, anxiety, posttraumatic stress disorder.  Said that she  had one suicide attempt, but long time ago.  Denied any previous psychiatric hospitalizations.  Denied self-injurious behavior.  Denied DUIs, DWIs.  She is on fluoxetine from PCP.  Said that she has a counselor, but was not seeing a counselor recently.    PAST MEDICAL HISTORY:  Significant for mental health.    PAST SURGICAL HISTORY:  Significant for  and tubes.    ALLERGIES:  DENIED ANY KNOWN MEDICAL ALLERGIES.    HOME MEDICATIONS:  Strattera 80 mg daily, fluoxetine 40 mg daily, naltrexone 100 mg daily.  The patient reports that she has not been taking medications compliantly recently.    FAMILY HISTORY AND SOCIAL HISTORY:  The patient has 3 children, a daughter and 2 sons from 2 different men.  Kids currently living with their dads.  The patient says that she has parental rights.  She is not working.  Lives alone.  Parents .  She mentioned going to her father.  She reports that she has chemical addiction on both sides of the family, but describes dad as occasional/social drinker.    For physical examination and 12-point review of system, please refer to Dr Raimundo Hunter's note from 2022.  I reviewed this note and agree with it.    VITAL SIGNS:  Temperature 97.6, heart rate 83, blood pressure 126/89.    MENTAL STATUS EXAMINATION:  The patient is a  female looking according to stated age.  Her face is flushed.  She is pretty irritable, especially in the beginning of interview and asked me a number of times why she needed to answer my questions if she already answered them a number of times before.  She eventually calmed down and provided information.  Denies suicidal or homicidal ideation, denied auditory or visual hallucinations.  Thought processes are linear, goal directed.  She is alert and oriented x3.  Fund of knowledge is average with proper usage of vocabulary.  Ability to focus, concentrate mildly impaired.  Gait and posture within normal limits.  Insight and judgment  moderately impaired.    IMPRESSION:  Polysubstance dependence, stimulants, alcohol, possibly benzodiazepines; historical diagnosis of posttraumatic stress disorder; unspecified depression and anxiety.    TREATMENT PLAN:  The patient was put on alcohol withdrawal protocol Valium.  Was also put on phenobarbital to help to detoxify her after use of Xanax.  I restarted her on a lower dose of fluoxetine 20 mg daily.  We will continue on multivitamins values.  We will use folic acid, thiamine.  Use melatonin, gabapentin p.r.n. for anxiety.  The patient indicated that she would like to go into a chemical dependency treatment program or would talk to her dad about going to him and going back to work, so her plans need to be clarified with her.    Eliceo Montana MD        D: 2022   T: 2022   MT: [place]    Name:     HECTOR PAUL  MRN:      [id number]        Account:     [id number]   :      1980           Admitted:    2022       Document: [id number]

## 2022-02-14 ENCOUNTER — HOSPITAL ENCOUNTER (OUTPATIENT)
Dept: BEHAVIORAL HEALTH | Facility: CLINIC | Age: 42
Discharge: HOME | End: 2022-02-14
Attending: FAMILY MEDICINE | Admitting: FAMILY MEDICINE
Payer: COMMERCIAL

## 2022-02-14 VITALS — WEIGHT: 104 LBS | HEIGHT: 62 IN | BODY MASS INDEX: 19.14 KG/M2

## 2022-02-14 PROCEDURE — H0001 ALCOHOL AND/OR DRUG ASSESS: HCPCS | Mod: GT,95

## 2022-02-14 RX ORDER — HYDROXYZINE HYDROCHLORIDE 25 MG/1
25-50 TABLET, FILM COATED ORAL EVERY 4 HOURS PRN
Status: ON HOLD | COMMUNITY
End: 2023-01-14

## 2022-02-14 ASSESSMENT — COLUMBIA-SUICIDE SEVERITY RATING SCALE - C-SSRS
6. HAVE YOU EVER DONE ANYTHING, STARTED TO DO ANYTHING, OR PREPARED TO DO ANYTHING TO END YOUR LIFE?: NO
5. HAVE YOU STARTED TO WORK OUT OR WORKED OUT THE DETAILS OF HOW TO KILL YOURSELF? DO YOU INTEND TO CARRY OUT THIS PLAN?: YES
LETHALITY/MEDICAL DAMAGE CODE FIRST ACTUAL ATTEMPT: MINOR PHYSICAL DAMAGE
5. HAVE YOU STARTED TO WORK OUT OR WORKED OUT THE DETAILS OF HOW TO KILL YOURSELF? DO YOU INTEND TO CARRY OUT THIS PLAN?: NO
3. HAVE YOU BEEN THINKING ABOUT HOW YOU MIGHT KILL YOURSELF?: NO
ATTEMPT LIFETIME: YES
LETHALITY/MEDICAL DAMAGE CODE MOST LETHAL ACTUAL ATTEMPT: MODERATE PHYSICAL DAMAGE, MEDICAL ATTENTION NEEDED
4. HAVE YOU HAD THESE THOUGHTS AND HAD SOME INTENTION OF ACTING ON THEM?: NO
LETHALITY/MEDICAL DAMAGE CODE MOST RECENT ACTUAL ATTEMPT: MODERATE PHYSICAL DAMAGE, MEDICAL ATTENTION NEEDED
1. IN THE PAST MONTH, HAVE YOU WISHED YOU WERE DEAD OR WISHED YOU COULD GO TO SLEEP AND NOT WAKE UP?: YES
REASONS FOR IDEATION LIFETIME: MOSTLY TO GET ATTENTION, REVENGE OR A REACTION FROM OTHERS
4. HAVE YOU HAD THESE THOUGHTS AND HAD SOME INTENTION OF ACTING ON THEM?: YES
ATTEMPT PAST THREE MONTHS: NO
6. HAVE YOU EVER DONE ANYTHING, STARTED TO DO ANYTHING, OR PREPARED TO DO ANYTHING TO END YOUR LIFE?: NO
2. HAVE YOU ACTUALLY HAD ANY THOUGHTS OF KILLING YOURSELF?: YES
TOTAL  NUMBER OF INTERRUPTED ATTEMPTS PAST 3 MONTHS: NO
TOTAL  NUMBER OF ABORTED OR SELF INTERRUPTED ATTEMPTS PAST 3 MONTHS: NO
TOTAL  NUMBER OF ABORTED OR SELF INTERRUPTED ATTEMPTS PAST LIFETIME: YES
TOTAL  NUMBER OF INTERRUPTED ATTEMPTS LIFETIME: YES
2. HAVE YOU ACTUALLY HAD ANY THOUGHTS OF KILLING YOURSELF LIFETIME?: YES
1. IN THE PAST MONTH, HAVE YOU WISHED YOU WERE DEAD OR WISHED YOU COULD GO TO SLEEP AND NOT WAKE UP?: YES

## 2022-02-14 ASSESSMENT — ANXIETY QUESTIONNAIRES
7. FEELING AFRAID AS IF SOMETHING AWFUL MIGHT HAPPEN: NEARLY EVERY DAY
3. WORRYING TOO MUCH ABOUT DIFFERENT THINGS: SEVERAL DAYS
2. NOT BEING ABLE TO STOP OR CONTROL WORRYING: NEARLY EVERY DAY
IF YOU CHECKED OFF ANY PROBLEMS ON THIS QUESTIONNAIRE, HOW DIFFICULT HAVE THESE PROBLEMS MADE IT FOR YOU TO DO YOUR WORK, TAKE CARE OF THINGS AT HOME, OR GET ALONG WITH OTHER PEOPLE: EXTREMELY DIFFICULT
1. FEELING NERVOUS, ANXIOUS, OR ON EDGE: NEARLY EVERY DAY
5. BEING SO RESTLESS THAT IT IS HARD TO SIT STILL: SEVERAL DAYS
GAD7 TOTAL SCORE: 17
4. TROUBLE RELAXING: NEARLY EVERY DAY
6. BECOMING EASILY ANNOYED OR IRRITABLE: NEARLY EVERY DAY

## 2022-02-14 ASSESSMENT — PAIN SCALES - GENERAL: PAINLEVEL: NO PAIN (0)

## 2022-02-14 ASSESSMENT — PATIENT HEALTH QUESTIONNAIRE - PHQ9: SUM OF ALL RESPONSES TO PHQ QUESTIONS 1-9: 23

## 2022-02-14 ASSESSMENT — MIFFLIN-ST. JEOR: SCORE: 1079.99

## 2022-02-14 NOTE — PROGRESS NOTES
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    Information in this assessment was obtained from the medical record and provided by patient who is a good historian.    Patient will have open access to their mental health medical record.      As evidenced by self report and criteria, client meets the following DSM5 Diagnoses:   (Sustained by DSM5 Criteria Listed Above)   Criteria for Diagnosis     Criteria for Diagnosis  DSM-5 Criteria for Substance Use Disorder  Instructions: Determine whether the client currently meets the criteria for Substance Use Disorder using the diagnostic criteria in the DSM-V pp.481-589. Current means during the most recent 12 months outside a facility that controls access to substances    Category of Substance Severity (ICD-10 Code / DSM 5 Code)     Alcohol Use Disorder Severe  (10.20) (303.90)   Cannabis Use Disorder The patient does not meet the criteria for a Cannabis use disorder.   Hallucinogen Use Disorder The patient does not meet the criteria for a Hallucinogen use disorder.   Inhalant Use Disorder The patient does not meet the criteria for an Inhalant use disorder.   Opioid Use Disorder The patient does not meet the criteria for an Opioid use disorder.   Sedative, Hypnotic, or Anxiolytic Use Disorder The patient does not meet the criteria for a Sedative/Hypnotic use disorder.   Stimulant  Related Disorder The patient does not currently meet the criteria for a Stimulant use disorder, but has a history of recent cocaine abuse.   Tobacco Use Disorder Mild    (Z72.0) (305.1)   Other (or unknown) Substance Use Disorder The patient does not meet the criteria for a Other (or unknown) Substance use disorder.       Specify if: In early remission:  After full criteria for alcohol/drug use disorder were previously met, none of the criteria for alcohol/drug use disorder have been met for at least 3 months but for less than 12 months (with the exception that Criterion A4,  Craving or a strong desire or urge to use alcohol/drug  may be met).     In sustained remission:   After full criteria for alcohol use disorder were previously met, non of the criteria for alcohol/drug use disorder have been met at any time during a period of 12 months or longer (with the exception that Criterion A4,  Craving or strong desire or urge to use alcohol/drug  may be met).   Specify if:   This additional specifier is used if the individual is in an environment where access to alcohol is restricted.    Mild: Presence of 2-3 symptoms  Moderate: Presence of 4-5 symptoms  Severe: Presence of 6 or more symptoms  Recommendations:     1. Plan for Safety and Risk Management:  Recommended that patient call 911 or go to the local ED should there be a change in any of these risk factors..      Report to child / adult protection services was NA.     2. RONEL Referrals:   Recommendations:      The pt meets criteria for and is requesting residential tx at [facility] in [place], [place].     Patient reports they are willing to follow these recommendations.  Patient would like the following family or other support people involved in their treatment:  TBD. Patient does not have a history of opiate use.    3. Mental Health Referrals:  The pt would like to participated in a RONEL program that can address her mental health needs and underlying causes for her  addiction.  She also has a therapist she can re-establish care with following tx.     4. Patient's identified no special considerations needed for tx..     5. Recommendations for treatment focus:   Depressed Mood - DX per pt.  Anxiety - DX per pt.  Alcohol / Substance Use - Alcohol-severe.  possible PTSD from past abuse as an adult..   ADHD- DX per pt.  Relapse  Family history of alcohol abuse.    Provider Name/ Credentials:  CHRISTIAN Hopkins  February 14, 2022    DAANES record has been saved.  Assessment ID: [id number]

## 2022-02-14 NOTE — PLAN OF CARE
Pt was discharged home. She verbalized understanding of follow-up recommendations. She was discharged home with all belongings. She denied any depression or anxiety at time of discharge.

## 2022-02-14 NOTE — ADDENDUM NOTE
Encounter addended by: Helen Hill [facility] on: 2/14/2022 4:40 PM   Actions taken: Clinical Note Signed

## 2022-02-15 ASSESSMENT — ANXIETY QUESTIONNAIRES: GAD7 TOTAL SCORE: 17

## 2022-12-12 NOTE — ED NOTES
Patient called requesting refill for Linzess. Writer informed patient medication was refilled on 06/20/22 for a 90 day supply and 3 refills. Patient states she tried picking up a refill, however pharmacy at [facility] in [place] informed her her insurance will not cover the medication. Writer contacted [facility] pharmacy in [place] to get more information. Pharmacist states a PA needs to be done for the Linzess. Pharmacist states a PA can be started on Linzess or the medication can be switched to Trulance which is the preferred option.     Pharmacist states they will fax over PA request. Call to patient to inform her of information. LM for patient to return phone call.    Pt told RN that she would like to go to detox, denied SI

## 2023-01-08 ENCOUNTER — HOSPITAL ENCOUNTER (EMERGENCY)
Facility: CLINIC | Age: 43
Discharge: HOME | End: 2023-01-08
Attending: EMERGENCY MEDICINE | Admitting: EMERGENCY MEDICINE
Payer: COMMERCIAL

## 2023-01-08 VITALS
OXYGEN SATURATION: 95 % | TEMPERATURE: 97.9 F | RESPIRATION RATE: 14 BRPM | DIASTOLIC BLOOD PRESSURE: 97 MMHG | HEART RATE: 83 BPM | SYSTOLIC BLOOD PRESSURE: 123 MMHG

## 2023-01-08 DIAGNOSIS — F10.920 ALCOHOLIC INTOXICATION WITHOUT COMPLICATION (H): ICD-10-CM

## 2023-01-08 PROCEDURE — 99283 EMERGENCY DEPT VISIT LOW MDM: CPT

## 2023-01-08 RX ORDER — ONDANSETRON 4 MG/1
4 TABLET, ORALLY DISINTEGRATING ORAL ONCE
Status: DISCONTINUED | OUTPATIENT
Start: 2023-01-08 | End: 2023-01-09 | Stop reason: HOSPADM

## 2023-01-08 ASSESSMENT — ACTIVITIES OF DAILY LIVING (ADL)
ADLS_ACUITY_SCORE: 35

## 2023-01-08 NOTE — ED NOTES
Bed: [facility]  Expected date:   Expected time:   Means of arrival:   Comments:  [phone] F etoh/green pt

## 2023-01-08 NOTE — ED TRIAGE NOTES
House keeping came in to find pt unresponsive; they called 911, as they thought she was . Pt reports staying at the hotel for the last 3 weeks; EMS reports there was 3 pint sized vodka bottles

## 2023-01-08 NOTE — ED NOTES
Pt ambulated to bathroom with steady gait.  Refuses detox. Wants to go home.  Asked patient if she could find a sober ride.  [facility] #[phone].  Will call after MD assessment.

## 2023-01-08 NOTE — ED PROVIDER NOTES
History     Chief Complaint:  Alcohol Intoxication     HPI   Adri Delarosa is a 42 year old female who presents with alcohol intoxication. Per triage note, the patient was found unresponsive by housekeeping at a hotel. The housekeeping staff thought the patient had  so they called 911.  EMS responded and found the patient was intoxicated.  She was not unresponsive.  No CPR was required.  The patient reports that she is unsure why she was at the hotel, but endorses alcohol consumption and states that she is drunk. Notes that she drinks alcohol everyday and states that in a few hours she will be vomiting and in pain. States that she does not want to go to a detox facility and that she wants to go home.     Independent Historian: EMS and Patient     Review of External Notes: None     ROS:  Review of Systems   Unable to perform ROS: Mental status change     Allergies:  Bupropion     Medications:    Atomoxetine   Fluoxetine  Hydroxyzine  Naltrexone     Past Medical History:    Alcohol abuse  Anxiety  Depression   Cigarette nicotine dependence  Alcohol withdrawal delirium     Past Surgical History:     section  Salpingectomy     Social History:  Presents alone  Presents via EMS  Smoker     Physical Exam     Patient Vitals for the past 24 hrs:   BP Temp Temp src Pulse Resp SpO2   23 -- -- -- 83 -- 95 %   23 1909 -- -- -- -- -- 93 %   23 1815 -- -- -- -- 14 94 %   23 1745 -- -- -- -- 16 94 %   23 1744 -- -- -- -- -- 94 %   23 1743 -- -- -- -- -- 93 %   23 1742 -- -- -- -- -- 94 %   23 1741 -- -- -- -- -- 94 %   23 1647 -- -- -- -- -- 95 %   23 1548 -- 97.9  F (36.6  C) Temporal -- 24 --   23 1542 -- -- -- -- -- 96 %   23 1541 (!) 123/97 -- -- 79 -- --      Physical Exam  Physical Exam   General:  Sitting on bed. Very pleasant, talkative, cooperative.  HENT:  No obvious trauma to head  Right Ear:  External ear normal.   Left Ear:   External ear normal.   Nose:  Nose normal.   Eyes:  Conjunctivae and EOM are normal. Pupils are equal, round, and reactive.   Neck: Normal range of motion. Neck supple. No tracheal deviation present.   CV:  Normal heart sounds. No murmur heard.  Pulm/Chest: Effort normal and breath sounds normal.   Abd: Soft. No distension. There is no tenderness. There is no rigidity, no rebound and no guarding.   M/S: Normal range of motion.   Neuro: Alert. GCS 15.  Skin: Skin is warm and dry. No rash noted. Not diaphoretic.   Psych: Normal mood and affect. Behavior is normal.     Emergency Department Course   Emergency Department Course & Assessments:  Consultations/Discussion of Management or Tests:  ED Course as of 01/08/23 2045   Sun Jan 08, 2023 1547 I obtained initial history and examined the patient as noted above.    1925 I rechecked and updated the patient.    1949 I rechecked and updated the patient.      Disposition:  The patient was discharged to home.     Impression & Plan    Medical Decision Making:  Adri Delarosa is a very pleasant 42 year old year old patient who presents to the emergency department with concern of alcohol intoxication.  Housekeeping found the patient at a hotel as she was supposed to check out and EMS was called.  The patient admits to drinking alcohol.  She was kept here for over 5 hours and she is clinically sober.  She was offered detox, but declined.  Encouraged sobriety.  Offered her resources for treatment but she declined.  Patient hemodynamically stable.  She had no additional medical concerns she wants addressed.  She felt safe being discharged.    The treatment plan was discussed with the patient and they expressed understanding of this plan and consented to the plan.  In addition, the patient will return to the emergency department if their symptoms persist, worsen, if new symptoms arise or if there is any concern as other pathology may be present that is not evident at this time.  They also understand the importance of close follow up in the clinic and if unable to do so will return to the emergency department for a reevaluation. All questions were answered.    Diagnosis:    ICD-10-CM    1. Alcoholic intoxication without complication (H)  F10.920          Scribe Disclosure:  I, Erika Nickshaye, am serving as a scribe at 3:55 PM on 1/8/2023 to document services personally performed by Avel Dunn DO based on my observations and the provider's statements to me.     1/8/2023   Avel Dunn DO Anderson, Robert James, DO  01/08/23 2045

## 2023-01-09 ENCOUNTER — HOSPITAL ENCOUNTER (INPATIENT)
Facility: CLINIC | Age: 43
LOS: 5 days | Discharge: HOME | End: 2023-01-14
Attending: EMERGENCY MEDICINE | Admitting: INTERNAL MEDICINE
Payer: COMMERCIAL

## 2023-01-09 DIAGNOSIS — K59.00 CONSTIPATION, UNSPECIFIED CONSTIPATION TYPE: ICD-10-CM

## 2023-01-09 DIAGNOSIS — K70.10 ALCOHOLIC HEPATITIS, UNSPECIFIED WHETHER ASCITES PRESENT (H): ICD-10-CM

## 2023-01-09 DIAGNOSIS — F41.9 ANXIETY: Primary | ICD-10-CM

## 2023-01-09 DIAGNOSIS — F10.930 ALCOHOL WITHDRAWAL SYNDROME WITHOUT COMPLICATION (H): ICD-10-CM

## 2023-01-09 PROBLEM — D69.6 THROMBOCYTOPENIA (H): Status: ACTIVE | Noted: 2023-01-09

## 2023-01-09 PROBLEM — F10.939 ALCOHOL WITHDRAWAL SEIZURE (H): Status: ACTIVE | Noted: 2023-01-09

## 2023-01-09 PROBLEM — D64.9 ANEMIA: Status: ACTIVE | Noted: 2023-01-09

## 2023-01-09 PROBLEM — F10.29 ALCOHOL DEPENDENCE WITH UNSPECIFIED ALCOHOL-INDUCED DISORDER (H): Status: ACTIVE | Noted: 2022-02-11

## 2023-01-09 PROBLEM — E87.6 HYPOKALEMIA: Status: ACTIVE | Noted: 2023-01-09

## 2023-01-09 PROBLEM — F19.10 POLYSUBSTANCE ABUSE (H): Status: ACTIVE | Noted: 2022-02-11

## 2023-01-09 PROBLEM — F10.939 ALCOHOL WITHDRAWAL SYNDROME WITH COMPLICATION, WITH UNSPECIFIED COMPLICATION (H): Status: ACTIVE | Noted: 2020-09-18

## 2023-01-09 PROBLEM — F32.9 MAJOR DEPRESSION: Status: ACTIVE | Noted: 2023-01-09

## 2023-01-09 PROBLEM — R56.9 ALCOHOL WITHDRAWAL SEIZURE (H): Status: ACTIVE | Noted: 2023-01-09

## 2023-01-09 LAB
ALBUMIN SERPL-MCNC: 3.3 G/DL (ref 3.4–5)
ALP SERPL-CCNC: 53 U/L (ref 40–150)
ALT SERPL W P-5'-P-CCNC: 161 U/L (ref 0–50)
ANION GAP SERPL CALCULATED.3IONS-SCNC: 10 MMOL/L (ref 3–14)
AST SERPL W P-5'-P-CCNC: 234 U/L (ref 0–45)
BASOPHILS # BLD AUTO: 0 10E3/UL (ref 0–0.2)
BASOPHILS NFR BLD AUTO: 1 %
BILIRUB SERPL-MCNC: 1.4 MG/DL (ref 0.2–1.3)
BUN SERPL-MCNC: 11 MG/DL (ref 7–30)
CALCIUM SERPL-MCNC: 8.3 MG/DL (ref 8.5–10.1)
CHLORIDE BLD-SCNC: 99 MMOL/L (ref 94–109)
CO2 SERPL-SCNC: 27 MMOL/L (ref 20–32)
CREAT SERPL-MCNC: 0.61 MG/DL (ref 0.52–1.04)
EOSINOPHIL # BLD AUTO: 0 10E3/UL (ref 0–0.7)
EOSINOPHIL NFR BLD AUTO: 1 %
ERYTHROCYTE [DISTWIDTH] IN BLOOD BY AUTOMATED COUNT: 10.8 % (ref 10–15)
ETHANOL SERPL-MCNC: <0.01 G/DL
GFR SERPL CREATININE-BSD FRML MDRD: >90 ML/MIN/1.73M2
GLUCOSE BLD-MCNC: 88 MG/DL (ref 70–99)
HCT VFR BLD AUTO: 32.7 % (ref 35–47)
HGB BLD-MCNC: 11.5 G/DL (ref 11.7–15.7)
IMM GRANULOCYTES # BLD: 0 10E3/UL
IMM GRANULOCYTES NFR BLD: 0 %
LIPASE SERPL-CCNC: 102 U/L (ref 73–393)
LYMPHOCYTES # BLD AUTO: 1.1 10E3/UL (ref 0.8–5.3)
LYMPHOCYTES NFR BLD AUTO: 23 %
MAGNESIUM SERPL-MCNC: 2.1 MG/DL (ref 1.6–2.3)
MCH RBC QN AUTO: 34.4 PG (ref 26.5–33)
MCHC RBC AUTO-ENTMCNC: 35.2 G/DL (ref 31.5–36.5)
MCV RBC AUTO: 98 FL (ref 78–100)
MONOCYTES # BLD AUTO: 0.6 10E3/UL (ref 0–1.3)
MONOCYTES NFR BLD AUTO: 12 %
NEUTROPHILS # BLD AUTO: 3.1 10E3/UL (ref 1.6–8.3)
NEUTROPHILS NFR BLD AUTO: 63 %
NRBC # BLD AUTO: 0 10E3/UL
NRBC BLD AUTO-RTO: 0 /100
PHOSPHATE SERPL-MCNC: 3.7 MG/DL (ref 2.5–4.5)
PLATELET # BLD AUTO: 127 10E3/UL (ref 150–450)
POTASSIUM BLD-SCNC: 3.3 MMOL/L (ref 3.4–5.3)
PROT SERPL-MCNC: 6.5 G/DL (ref 6.8–8.8)
RBC # BLD AUTO: 3.34 10E6/UL (ref 3.8–5.2)
SODIUM SERPL-SCNC: 136 MMOL/L (ref 133–144)
WBC # BLD AUTO: 4.8 10E3/UL (ref 4–11)

## 2023-01-09 PROCEDURE — 96360 HYDRATION IV INFUSION INIT: CPT

## 2023-01-09 PROCEDURE — 36415 COLL VENOUS BLD VENIPUNCTURE: CPT | Performed by: EMERGENCY MEDICINE

## 2023-01-09 PROCEDURE — 85004 AUTOMATED DIFF WBC COUNT: CPT | Performed by: EMERGENCY MEDICINE

## 2023-01-09 PROCEDURE — 99285 EMERGENCY DEPT VISIT HI MDM: CPT | Mod: 25

## 2023-01-09 PROCEDURE — 84100 ASSAY OF PHOSPHORUS: CPT | Performed by: INTERNAL MEDICINE

## 2023-01-09 PROCEDURE — 99223 1ST HOSP IP/OBS HIGH 75: CPT | Mod: AI | Performed by: INTERNAL MEDICINE

## 2023-01-09 PROCEDURE — 82077 ASSAY SPEC XCP UR&BREATH IA: CPT | Performed by: EMERGENCY MEDICINE

## 2023-01-09 PROCEDURE — 250N000013 HC RX MED GY IP 250 OP 250 PS 637: Performed by: EMERGENCY MEDICINE

## 2023-01-09 PROCEDURE — 83690 ASSAY OF LIPASE: CPT | Performed by: EMERGENCY MEDICINE

## 2023-01-09 PROCEDURE — 80053 COMPREHEN METABOLIC PANEL: CPT | Performed by: EMERGENCY MEDICINE

## 2023-01-09 PROCEDURE — 250N000013 HC RX MED GY IP 250 OP 250 PS 637: Performed by: INTERNAL MEDICINE

## 2023-01-09 PROCEDURE — 120N000001 HC R&B MED SURG/OB

## 2023-01-09 PROCEDURE — 83735 ASSAY OF MAGNESIUM: CPT | Performed by: INTERNAL MEDICINE

## 2023-01-09 PROCEDURE — 258N000003 HC RX IP 258 OP 636: Performed by: EMERGENCY MEDICINE

## 2023-01-09 RX ORDER — MULTIPLE VITAMINS W/ MINERALS TAB 9MG-400MCG
1 TAB ORAL DAILY
Status: DISCONTINUED | OUTPATIENT
Start: 2023-01-09 | End: 2023-01-14 | Stop reason: HOSPADM

## 2023-01-09 RX ORDER — AMOXICILLIN 250 MG
2 CAPSULE ORAL 2 TIMES DAILY PRN
Status: DISCONTINUED | OUTPATIENT
Start: 2023-01-09 | End: 2023-01-14 | Stop reason: HOSPADM

## 2023-01-09 RX ORDER — DIAZEPAM 5 MG
5 TABLET ORAL ONCE
Status: COMPLETED | OUTPATIENT
Start: 2023-01-09 | End: 2023-01-09

## 2023-01-09 RX ORDER — LIDOCAINE 40 MG/G
CREAM TOPICAL
Status: DISCONTINUED | OUTPATIENT
Start: 2023-01-09 | End: 2023-01-14 | Stop reason: HOSPADM

## 2023-01-09 RX ORDER — AMOXICILLIN 250 MG
1 CAPSULE ORAL 2 TIMES DAILY PRN
Status: DISCONTINUED | OUTPATIENT
Start: 2023-01-09 | End: 2023-01-14 | Stop reason: HOSPADM

## 2023-01-09 RX ORDER — MULTIVITAMIN,THERAPEUTIC
1 TABLET ORAL ONCE
Status: COMPLETED | OUTPATIENT
Start: 2023-01-09 | End: 2023-01-09

## 2023-01-09 RX ORDER — ACETAMINOPHEN 325 MG/1
650 TABLET ORAL EVERY 6 HOURS PRN
Status: DISCONTINUED | OUTPATIENT
Start: 2023-01-09 | End: 2023-01-14 | Stop reason: HOSPADM

## 2023-01-09 RX ORDER — OLANZAPINE 5 MG/1
5-10 TABLET, ORALLY DISINTEGRATING ORAL EVERY 6 HOURS PRN
Status: DISCONTINUED | OUTPATIENT
Start: 2023-01-09 | End: 2023-01-14 | Stop reason: HOSPADM

## 2023-01-09 RX ORDER — DIAZEPAM 5 MG
10 TABLET ORAL EVERY 30 MIN PRN
Status: DISCONTINUED | OUTPATIENT
Start: 2023-01-09 | End: 2023-01-14 | Stop reason: HOSPADM

## 2023-01-09 RX ORDER — CLONIDINE HYDROCHLORIDE 0.1 MG/1
0.1 TABLET ORAL EVERY 8 HOURS
Status: DISCONTINUED | OUTPATIENT
Start: 2023-01-09 | End: 2023-01-14 | Stop reason: HOSPADM

## 2023-01-09 RX ORDER — ONDANSETRON 2 MG/ML
4 INJECTION INTRAMUSCULAR; INTRAVENOUS EVERY 6 HOURS PRN
Status: DISCONTINUED | OUTPATIENT
Start: 2023-01-09 | End: 2023-01-14 | Stop reason: HOSPADM

## 2023-01-09 RX ORDER — DIAZEPAM 10 MG/2ML
5-10 INJECTION, SOLUTION INTRAMUSCULAR; INTRAVENOUS EVERY 30 MIN PRN
Status: DISCONTINUED | OUTPATIENT
Start: 2023-01-09 | End: 2023-01-10

## 2023-01-09 RX ORDER — GABAPENTIN 100 MG/1
100 CAPSULE ORAL EVERY 8 HOURS
Status: DISCONTINUED | OUTPATIENT
Start: 2023-01-16 | End: 2023-01-14 | Stop reason: HOSPADM

## 2023-01-09 RX ORDER — DIAZEPAM 5 MG
10 TABLET ORAL EVERY 30 MIN PRN
Status: DISCONTINUED | OUTPATIENT
Start: 2023-01-09 | End: 2023-01-10

## 2023-01-09 RX ORDER — DIAZEPAM 10 MG/2ML
5-10 INJECTION, SOLUTION INTRAMUSCULAR; INTRAVENOUS EVERY 30 MIN PRN
Status: DISCONTINUED | OUTPATIENT
Start: 2023-01-09 | End: 2023-01-14 | Stop reason: HOSPADM

## 2023-01-09 RX ORDER — FOLIC ACID 1 MG/1
1 TABLET ORAL ONCE
Status: COMPLETED | OUTPATIENT
Start: 2023-01-09 | End: 2023-01-09

## 2023-01-09 RX ORDER — GABAPENTIN 300 MG/1
1200 CAPSULE ORAL ONCE
Status: COMPLETED | OUTPATIENT
Start: 2023-01-09 | End: 2023-01-09

## 2023-01-09 RX ORDER — ONDANSETRON 4 MG/1
4 TABLET, ORALLY DISINTEGRATING ORAL EVERY 6 HOURS PRN
Status: DISCONTINUED | OUTPATIENT
Start: 2023-01-09 | End: 2023-01-14 | Stop reason: HOSPADM

## 2023-01-09 RX ORDER — GABAPENTIN 300 MG/1
900 CAPSULE ORAL EVERY 8 HOURS
Status: COMPLETED | OUTPATIENT
Start: 2023-01-10 | End: 2023-01-12

## 2023-01-09 RX ORDER — GABAPENTIN 300 MG/1
300 CAPSULE ORAL EVERY 8 HOURS
Status: DISCONTINUED | OUTPATIENT
Start: 2023-01-14 | End: 2023-01-14 | Stop reason: HOSPADM

## 2023-01-09 RX ORDER — FLUMAZENIL 0.1 MG/ML
0.2 INJECTION, SOLUTION INTRAVENOUS
Status: DISCONTINUED | OUTPATIENT
Start: 2023-01-09 | End: 2023-01-14 | Stop reason: HOSPADM

## 2023-01-09 RX ORDER — ACETAMINOPHEN 650 MG/1
650 SUPPOSITORY RECTAL EVERY 6 HOURS PRN
Status: DISCONTINUED | OUTPATIENT
Start: 2023-01-09 | End: 2023-01-14 | Stop reason: HOSPADM

## 2023-01-09 RX ORDER — ATOMOXETINE 80 MG/1
80 CAPSULE ORAL DAILY
COMMUNITY

## 2023-01-09 RX ORDER — FOLIC ACID 1 MG/1
1 TABLET ORAL DAILY
Status: DISCONTINUED | OUTPATIENT
Start: 2023-01-09 | End: 2023-01-14 | Stop reason: HOSPADM

## 2023-01-09 RX ORDER — HYDROXYZINE HYDROCHLORIDE 25 MG/1
25-50 TABLET, FILM COATED ORAL EVERY 4 HOURS PRN
Status: DISCONTINUED | OUTPATIENT
Start: 2023-01-09 | End: 2023-01-14 | Stop reason: HOSPADM

## 2023-01-09 RX ORDER — HALOPERIDOL 5 MG/ML
2.5-5 INJECTION INTRAMUSCULAR EVERY 6 HOURS PRN
Status: DISCONTINUED | OUTPATIENT
Start: 2023-01-09 | End: 2023-01-14 | Stop reason: HOSPADM

## 2023-01-09 RX ORDER — GABAPENTIN 300 MG/1
600 CAPSULE ORAL EVERY 8 HOURS
Status: DISCONTINUED | OUTPATIENT
Start: 2023-01-12 | End: 2023-01-14 | Stop reason: HOSPADM

## 2023-01-09 RX ADMIN — DIAZEPAM 10 MG: 5 TABLET ORAL at 18:30

## 2023-01-09 RX ADMIN — DIAZEPAM 5 MG: 5 TABLET ORAL at 12:46

## 2023-01-09 RX ADMIN — CLONIDINE HYDROCHLORIDE 0.1 MG: 0.1 TABLET ORAL at 20:43

## 2023-01-09 RX ADMIN — FOLIC ACID 1 MG: 1 TABLET ORAL at 18:19

## 2023-01-09 RX ADMIN — THIAMINE HCL TAB 100 MG 100 MG: 100 TAB at 12:47

## 2023-01-09 RX ADMIN — GABAPENTIN 1200 MG: 300 CAPSULE ORAL at 18:19

## 2023-01-09 RX ADMIN — MULTIPLE VITAMINS W/ MINERALS TAB 1 TABLET: TAB at 18:19

## 2023-01-09 RX ADMIN — THERA TABS 1 TABLET: TAB at 12:46

## 2023-01-09 RX ADMIN — FOLIC ACID 1 MG: 1 TABLET ORAL at 12:46

## 2023-01-09 RX ADMIN — THIAMINE HCL TAB 100 MG 100 MG: 100 TAB at 18:19

## 2023-01-09 RX ADMIN — SODIUM CHLORIDE 1000 ML: 9 INJECTION, SOLUTION INTRAVENOUS at 12:42

## 2023-01-09 ASSESSMENT — ACTIVITIES OF DAILY LIVING (ADL)
ADLS_ACUITY_SCORE: 35
ADLS_ACUITY_SCORE: 37
ADLS_ACUITY_SCORE: 35
ADLS_ACUITY_SCORE: 37

## 2023-01-09 ASSESSMENT — ENCOUNTER SYMPTOMS
NERVOUS/ANXIOUS: 1
SHORTNESS OF BREATH: 0
HEADACHES: 0
ABDOMINAL PAIN: 0
TREMORS: 1
CONFUSION: 1
FEVER: 0

## 2023-01-09 NOTE — ED NOTES
[facility]  ED Nurse Handoff Report    ED Chief complaint: Seizures and Alcohol Problem      ED Diagnosis:   Final diagnoses:   Alcohol withdrawal syndrome without complication (H)   Alcoholic hepatitis, unspecified whether ascites present       Code Status: Full Code    Allergies:   Allergies   Allergen Reactions    Bupropion      Other reaction(s): Convulsions  Severity: Unknown; Type: Drug Allergy;        Patient Story: alcohol problem  Focused Assessment:  Patient with alcohol issues here 2 days in a row for withdraw seizure concerns. Will be admitted for further evaluation and monitoring    Treatments and/or interventions provided: See MAR  Patient's response to treatments and/or interventions: TBD    To be done/followed up on inpatient unit:  close monitor, follow MD orders    Does this patient have any cognitive concerns?:  na    Activity level - Baseline/Home:  Independent  Activity Level - Current:   Stand with Assist    Patient's Preferred language: English   Needed?: No    Isolation: None  Infection: Not Applicable  Patient tested for COVID 19 prior to admission: NO  Bariatric?: No    Vital Signs:   Vitals:    01/09/23 1225 01/09/23 1230 01/09/23 1355 01/09/23 1400   BP: (!) 145/106 (!) 145/106  (!) 136/98   Pulse: 91  73 70   Resp: 18  10    Temp: 98.3  F (36.8  C)      TempSrc: Oral      SpO2: 98% 97%         Cardiac Rhythm:     Was the PSS-3 completed:   Yes  What interventions are required if any?               Family Comments: na  OBS brochure/video discussed/provided to patient/family: N/A              Name of person given brochure if not patient: na              Relationship to patient: na    For the majority of the shift this patient's behavior was Green.   Behavioral interventions performed were none.    ED NURSE PHONE NUMBER: *[id number]

## 2023-01-09 NOTE — ED TRIAGE NOTES
Pt BIBA-here yesterday. Patient reports yesterday morning was her last drink. Patient found in hotel room today with multiple empty liquor bottles. Patient called mother d/t waking up with vomit and drool all over. Patient tremulous, unsteady gait. Hx of seizures.    18g right forearm, 400mL IVF, 2.5mg IV versed.    EMS glucose: 94

## 2023-01-09 NOTE — ED PROVIDER NOTES
History     Chief Complaint:  Seizures and Alcohol Problem       HPI   Adri Delarosa is a 42 year old female with history of alcohol abuse who presents with a possible alcohol withdrawal seizure at a hotel. Patient was seen yesterday at this ED for alcohol intoxication but declined detox. Patient discharged, went to a hotel, fell asleep, and woke up confused with vomit on her face causing her to be concerned that she had a seizure. EMS reports that patient's mother called for a check up and on their arrival patient was ambulating but tremulous. Heart rate was 98 and blood sugar was 94. EMS gave 400 ml normal saline and 2.5 mg IV versed which the patient feels helped her. Patient has had alcohol withdrawal seizures in the past. Her last drink was yesterday morning and she has been drinking vodka heavily for the past 3 week. Patient feels that her symptoms are similar to prior withdrawals and notes having been to detox a year ago. She reports biting her tongue but denies headaches, Incontinence, falls, and injury. Patient has not recently seen her primary care provider.     Independent Historian: patient        ROS:  Review of Systems   Constitutional: Negative for fever.   Respiratory: Negative for shortness of breath.    Cardiovascular: Negative for chest pain.   Gastrointestinal: Negative for abdominal pain.   Neurological: Positive for tremors. Negative for headaches.   Psychiatric/Behavioral: Positive for confusion. The patient is nervous/anxious.    All other systems reviewed and are negative.    Allergies:  Bupropion     Medications:    hydroxyzine     Past Medical History:    Alcohol abuse   Anxiety   Depressive disorder     Past Surgical History:     section  Tubal ligation     Social History:  Patient presents alone     Physical Exam     Patient Vitals for the past 24 hrs:   BP Temp Temp src Pulse Resp SpO2   23 1230 (!) 145/106 -- -- -- -- 97 %   23 1225 (!) 145/106 98.3  F (36.8   C) Oral 91 18 98 %        Physical Exam  General: Alert and cooperative with exam. Patient in Mild to moderate distress. Normal mentation. Tremulous appearence.  Head:  Scalp is NC/AT  Eyes:  No scleral icterus, PERRL  ENT:  The external nose and ears are normal. The oropharynx is normal and without erythema; mucus membranes are moist. Uvula midline, no evidence of deep space infection.  Mild tongue abrasion  Neck:  Normal range of motion without rigidity.  CV:  Regular rate and rhythm  Resp:  Breath sounds are clear bilaterally    Non-labored, no retractions or accessory muscle use  GI:  Abdomen is soft, no distension, mild diffuse abdominal tenderness. No peritoneal signs  MS:  No lower extremity edema   Skin:  Warm and dry, No rash or lesions noted.  Neuro: Oriented x 3. No gross motor deficits.  No asterixis    Emergency Department Course   Laboratory:  Labs Ordered and Resulted from Time of ED Arrival to Time of ED Departure   COMPREHENSIVE METABOLIC PANEL - Abnormal       Result Value    Sodium 136      Potassium 3.3 (*)     Chloride 99      Carbon Dioxide (CO2) 27      Anion Gap 10      Urea Nitrogen 11      Creatinine 0.61      Calcium 8.3 (*)     Glucose 88      Alkaline Phosphatase 53       (*)      (*)     Protein Total 6.5 (*)     Albumin 3.3 (*)     Bilirubin Total 1.4 (*)     GFR Estimate >90     CBC WITH PLATELETS AND DIFFERENTIAL - Abnormal    WBC Count 4.8      RBC Count 3.34 (*)     Hemoglobin 11.5 (*)     Hematocrit 32.7 (*)     MCV 98      MCH 34.4 (*)     MCHC 35.2      RDW 10.8      Platelet Count 127 (*)     % Neutrophils 63      % Lymphocytes 23      % Monocytes 12      % Eosinophils 1      % Basophils 1      % Immature Granulocytes 0      NRBCs per 100 WBC 0      Absolute Neutrophils 3.1      Absolute Lymphocytes 1.1      Absolute Monocytes 0.6      Absolute Eosinophils 0.0      Absolute Basophils 0.0      Absolute Immature Granulocytes 0.0      Absolute NRBCs 0.0     LIPASE -  Normal    Lipase 102     ETHYL ALCOHOL LEVEL - Normal    Alcohol ethyl <0.01            Emergency Department Course & Assessments:  Interventions:  1242 Sodium chloride bolus, 1000 ml, IV 1246 Multivitamin, 1 tablet oral  1246 Folic acid, 1 mg, oral  1246 Valium 5 mg, oral  1247 Thiaine, 100 mg, oral       Consultations/Discussion of Management or Tests:  6970 I spoke with Verito Byrne MD of the Hospitalist service from [facility] regarding patient's presentation, findings, and plan of care.       Social Determinants of Health affecting care:  Alcoholic, poor social support    Disposition:  The patient was admitted to the hospital under the care of Verito Byrne MD.     Impression & Plan    Medical Decision Making:  Adri Delarosa is a 42 year old female who presents for evaluation of alcohol abuse and patient concern for potential withdrawal seizure.  She is not intoxicated here in ED by blood work.  Blood work notable for evidence of alcoholic hepatitis.  No significant abdominal tenderness on exam.  She appears on exam to be going through acute alcohol withdrawal.  We discussed this and decided on treatment w/ benzodiazepines, see medications given in ED above. Admit to medicine for further cares given history and withdrawal here. Likely will be a prolonged withdrawal course given this and do not feel patient can safely manage as outpatient nor a more ambulatory setting like detox.       Diagnosis:    ICD-10-CM    1. Alcohol withdrawal syndrome without complication (H)  F10.930       2. Alcoholic hepatitis, unspecified whether ascites present  K70.10            Scribe Disclosure:  I, Nate Us, am serving as a scribe at 12:23 PM on 1/9/2023 to document services personally performed by Chris Harding DO based on my observations and the provider's statements to me.     1/9/2023   DO Meaghan Lane Christopher Warren, DO  01/09/23 2733

## 2023-01-09 NOTE — PROVIDER NOTIFICATION
MD Notification    Notified Person: MD    Notified Person Name: Natalie    Notification Date/Time: 1/9/2023 @ 1705    Notification Interaction: Amcom page sent    Purpose of Notification: K+ 3.3 and FYI regarding releasing some orders as patient still waiting for bed.    Orders Received: Release signed & held orders if patient still boarding by 1800.    Comments:

## 2023-01-09 NOTE — H&P
[facility]    History and Physical - Hospitalist Service       Date of Admission:  1/9/2023    Assessment & Plan      Adri Delarosa is a 42 year old female with history of alcohol abuse, alcohol withdrawal delirium, anxiety depression, tobacco use disorder who presents the emergency department reporting a possible (unwitnessed) alcohol withdrawal seizure.  She is admitted for further evaluation and treatment of acute alcohol withdrawal syndrome.    Principal Problem:    Alcohol withdrawal syndrome with complication, with unspecified complication (H)    Alcohol withdrawal seizure (H), possible    Admit as inpatient    Treat with Valium using slums order set    Seizure precautions  Active Problems:    Alcohol dependence with unspecified alcohol-induced disorder (H)    Notes from a February, 2022 hospital stay indicate she has had 5-6 chemical dependency treatments in the past and her longest period of sobriety was 2-1/2 years.  She was discharged from [facility] in February 2 residential chemical dependency treatment at [facility].    Chemical dependency consult requested    Social work consult requested    Alcoholic hepatitis, unspecified whether ascites present    Recheck liver profile    Hypokalemia    Replace potassium per protocol    Anemia    Thrombocytopenia (H)    Follow-up blood counts    Major depression    Hold Prozac due to potential interaction between Prozac and Valium.  Note that she also takes a relatively high dose of Prozac and Prozac has a long half-life.       # Hypokalemia: Lowest K = 3.3 mmol/L in last 2 days, will replace as needed   # Hypocalcemia: Lowest Ca = 8.3 mg/dL in last 2 days, will monitor and replace as appropriate     # Hypoalbuminemia: Lowest albumin = 3.3 g/dL at 1/9/2023 12:41 PM, will monitor as appropriate   # Thrombocytopenia: Lowest platelets = 127 in last 2 days, will monitor for bleeding               Diet:   Regular  DVT Prophylaxis:  "Pneumatic Compression Devices  White Catheter: Not present  Lines: None     Cardiac Monitoring: None  Code Status:   Full    Clinically Significant Risk Factors Present on Admission        Disposition Plan      Expected Discharge Date: 2023                  Verito Estrada MD  Hospitalist Service  [facility]  Securely message with [employer] (more info)  Text page via [email or web address] Paging/Directory     ______________________________________________________________________    Chief Complaint   \"I think I had a seizure.\"      History of Present Illness   Adri Delarosa is a 42 year old female with history of alcohol abuse, alcohol withdrawal delirium, anxiety depression, tobacco use disorder who presents the emergency department reporting a possible (unwitnessed) alcohol withdrawal seizure.     Adri presented to [facility] ED yesterday (2023) with alcohol intoxication, please see notes from that visit.  They reported that housekeeping staff found the patient at a hotel, they worried that she was unresponsive and may have  so they called 911.  EMS responded and found the patient was alive but intoxicated, no CPR was required.  She was in the ED for about 5 hours and was offered detox but declined.  She requested discharge home.    Notes from a   hospital stay at [facility] indicate that, \"the history is remarkable for alcohol dependency with a high tolerance to alcohol, history of delirium tremens and no withdrawal seizures; however she had Wellbutrin induced seizures in the past x3.\"    EMS notes were reviewed.  Apparently the patient called her family saying that she drank quite heavily yesterday and thought she may have had a seizure related to alcohol withdrawal during the night.  Family members called EMS who found her at a hotel, intoxicated.  They again brought her to the emergency department for evaluation where she is admitted.    Past Medical History    Past " Medical History:   Diagnosis Date     Alcohol abuse      Anxiety      Depressive disorder        Past Surgical History   Past Surgical History:   Procedure Laterality Date     GYN SURGERY      C secton and tubes tide     NO HISTORY OF SURGERY         Prior to Admission Medications   Prior to Admission Medications   Prescriptions Last Dose Informant Patient Reported? Taking?   FLUoxetine (PROZAC) 20 MG capsule Past Month at - Self Yes Yes   Sig: Take 60 mg by mouth daily   atomoxetine (STRATTERA) 80 MG capsule Past Month at - Self Yes Yes   Sig: Take 80 mg by mouth daily   hydrOXYzine (ATARAX) 25 MG tablet prn at prn Self Yes No   Sig: Take 25-50 mg by mouth every 4 hours as needed   naltrexone (DEPADE/REVIA) 50 MG tablet Past Month at - Self Yes Yes   Sig: Take 100 mg by mouth daily      Facility-Administered Medications: None        Review of Systems    The 10 point Review of Systems is negative other than noted in the HPI or here.     Social History   I have reviewed this patient's social history and updated it with pertinent information if needed.  Social History     Tobacco Use     Smoking status: Every Day     Packs/day: 0.50     Types: Cigarettes     Smokeless tobacco: Never   Substance Use Topics     Alcohol use: Yes     Drug use: Never       Family History   I have reviewed this patient's family history and updated it with pertinent information if needed.  Family History   Problem Relation Age of Onset     Alcoholism Maternal Grandmother      Substance Abuse Maternal Grandmother      Substance Abuse Maternal Grandfather        Allergies   Allergies   Allergen Reactions     Bupropion      Other reaction(s): Convulsions  Severity: Unknown; Type: Drug Allergy;         Physical Exam   Vital Signs: Temp: 98.3  F (36.8  C) Temp src: Oral BP: (!) 136/98 Pulse: 70   Resp: 10 SpO2: 97 %      Weight: 0 lbs 0 oz    Constitutional: Dozing, wakes to voice  Eyes: Conjunctiva and pupils examined and normal.  HEENT: Moist  mucous membranes, normal dentition.  No obvious tongue laceration or mouth ulcer  Respiratory: Clear to auscultation bilaterally, no crackles or wheezing.  Cardiovascular: Regular rate and rhythm  GI: Soft, non-distended, non-tender, normal bowel sounds.hy.  Skin: No rash on exposed skin, no lower extremity edema  Musculoskeletal: No joint swelling, erythema   Neurologic: moves arms and legs, exam is nonfocal  Psychiatric: mood is anxious      Medical Decision Making       75 MINUTES SPENT BY ME on the date of service doing chart review, history, exam, documentation & further activities per the note.  MANAGEMENT DISCUSSED with the following over the past 24 hours: Dr. Harding   NOTE(S)/MEDICAL RECORDS REVIEWED over the past 24 hours: scribe  Medical complexity over the past 24 hours:  - Parenteral (IV) CONTROLLED SUBSTANCES ordered  - Intensive monitoring for MEDICATION TOXICITY      Data     I have personally reviewed the following data over the past 24 hrs:    4.8  \   11.5 (L)   / 127 (L)     136 99 11 /  88   3.3 (L) 27 0.61 \       ALT: 161 (H) AST: 234 (H) AP: 53 TBILI: 1.4 (H)   ALB: 3.3 (L) TOT PROTEIN: 6.5 (L) LIPASE: 102       Imaging results reviewed over the past 24 hrs:   No results found for this or any previous visit (from the past 24 hour(s)).

## 2023-01-09 NOTE — PROGRESS NOTES
Pharmacy Medication History  Admission medication history interview status for the 1/9/2023  admission is complete. See EPIC admission navigator for prior to admission medications     Location of Interview: Patient room  Medication history sources: Patient and Surescripts    Significant changes made to the medication list:  See below    In the past week, patient estimated taking medication this percent of the time: NA    Additional medication history information:   -Patient had difficulty providing med hx details. Was somnolent and sometimes fell asleep during questions.   -Pt reports she has not had any meds (except for the hydroxyzine) in at least a month. The remaining meds were flagged for review.   -Pt reports she is not taking the disulfiram and no longer has the lorazepam at home. Not using nicotine replacement.     Medication reconciliation completed by provider prior to medication history? No    Time spent in this activity: 15 mins    Prior to Admission medications    Medication Sig Last Dose Taking? Auth Provider Long Term End Date   atomoxetine (STRATTERA) 80 MG capsule Take 80 mg by mouth daily Past Month at - Yes Unknown, Entered By History     FLUoxetine (PROZAC) 20 MG capsule Take 60 mg by mouth daily Past Month at - Yes Unknown, Entered By History No    naltrexone (DEPADE/REVIA) 50 MG tablet Take 100 mg by mouth daily Past Month at - Yes Unknown, Entered By History No    hydrOXYzine (ATARAX) 25 MG tablet Take 25-50 mg by mouth every 4 hours as needed prn at prn  Reported, Patient         The information provided in this note is only as accurate as the sources available at the time of update(s)

## 2023-01-09 NOTE — ED NOTES
Bed: ED15  Expected date:   Expected time:   Means of arrival:   Comments:  [facility] [address] seizure

## 2023-01-10 LAB
ALBUMIN SERPL-MCNC: 3 G/DL (ref 3.4–5)
ALP SERPL-CCNC: 55 U/L (ref 40–150)
ALT SERPL W P-5'-P-CCNC: 135 U/L (ref 0–50)
ANION GAP SERPL CALCULATED.3IONS-SCNC: 7 MMOL/L (ref 3–14)
AST SERPL W P-5'-P-CCNC: 184 U/L (ref 0–45)
BILIRUB DIRECT SERPL-MCNC: 0.4 MG/DL (ref 0–0.2)
BILIRUB SERPL-MCNC: 1.3 MG/DL (ref 0.2–1.3)
BUN SERPL-MCNC: 10 MG/DL (ref 7–30)
CALCIUM SERPL-MCNC: 8.6 MG/DL (ref 8.5–10.1)
CHLORIDE BLD-SCNC: 101 MMOL/L (ref 94–109)
CO2 SERPL-SCNC: 28 MMOL/L (ref 20–32)
CREAT SERPL-MCNC: 0.82 MG/DL (ref 0.52–1.04)
ERYTHROCYTE [DISTWIDTH] IN BLOOD BY AUTOMATED COUNT: 10.7 % (ref 10–15)
GFR SERPL CREATININE-BSD FRML MDRD: 90 ML/MIN/1.73M2
GLUCOSE BLD-MCNC: 98 MG/DL (ref 70–99)
HCT VFR BLD AUTO: 34.9 % (ref 35–47)
HGB BLD-MCNC: 12 G/DL (ref 11.7–15.7)
MCH RBC QN AUTO: 34.5 PG (ref 26.5–33)
MCHC RBC AUTO-ENTMCNC: 34.4 G/DL (ref 31.5–36.5)
MCV RBC AUTO: 100 FL (ref 78–100)
PLATELET # BLD AUTO: 121 10E3/UL (ref 150–450)
POTASSIUM BLD-SCNC: 3.5 MMOL/L (ref 3.4–5.3)
PROT SERPL-MCNC: 6.2 G/DL (ref 6.8–8.8)
RBC # BLD AUTO: 3.48 10E6/UL (ref 3.8–5.2)
SODIUM SERPL-SCNC: 136 MMOL/L (ref 133–144)
WBC # BLD AUTO: 4.1 10E3/UL (ref 4–11)

## 2023-01-10 PROCEDURE — 85027 COMPLETE CBC AUTOMATED: CPT | Performed by: INTERNAL MEDICINE

## 2023-01-10 PROCEDURE — 999N000216 HC STATISTIC ADULT CD FACE TO FACE-NO CHRG

## 2023-01-10 PROCEDURE — 36415 COLL VENOUS BLD VENIPUNCTURE: CPT | Performed by: INTERNAL MEDICINE

## 2023-01-10 PROCEDURE — 82310 ASSAY OF CALCIUM: CPT | Performed by: INTERNAL MEDICINE

## 2023-01-10 PROCEDURE — 250N000013 HC RX MED GY IP 250 OP 250 PS 637: Performed by: INTERNAL MEDICINE

## 2023-01-10 PROCEDURE — 120N000001 HC R&B MED SURG/OB

## 2023-01-10 PROCEDURE — 82248 BILIRUBIN DIRECT: CPT | Performed by: INTERNAL MEDICINE

## 2023-01-10 PROCEDURE — 99232 SBSQ HOSP IP/OBS MODERATE 35: CPT | Performed by: STUDENT IN AN ORGANIZED HEALTH CARE EDUCATION/TRAINING PROGRAM

## 2023-01-10 RX ADMIN — DIAZEPAM 10 MG: 5 TABLET ORAL at 16:27

## 2023-01-10 RX ADMIN — DIAZEPAM 10 MG: 5 TABLET ORAL at 00:14

## 2023-01-10 RX ADMIN — GABAPENTIN 900 MG: 300 CAPSULE ORAL at 16:19

## 2023-01-10 RX ADMIN — CLONIDINE HYDROCHLORIDE 0.1 MG: 0.1 TABLET ORAL at 04:37

## 2023-01-10 RX ADMIN — GABAPENTIN 900 MG: 300 CAPSULE ORAL at 08:01

## 2023-01-10 RX ADMIN — CLONIDINE HYDROCHLORIDE 0.1 MG: 0.1 TABLET ORAL at 20:05

## 2023-01-10 RX ADMIN — MULTIPLE VITAMINS W/ MINERALS TAB 1 TABLET: TAB at 08:02

## 2023-01-10 RX ADMIN — DIAZEPAM 10 MG: 5 TABLET ORAL at 23:49

## 2023-01-10 RX ADMIN — DIAZEPAM 10 MG: 5 TABLET ORAL at 08:02

## 2023-01-10 RX ADMIN — CLONIDINE HYDROCHLORIDE 0.1 MG: 0.1 TABLET ORAL at 11:36

## 2023-01-10 RX ADMIN — THIAMINE HCL TAB 100 MG 100 MG: 100 TAB at 08:02

## 2023-01-10 RX ADMIN — GABAPENTIN 900 MG: 300 CAPSULE ORAL at 01:45

## 2023-01-10 RX ADMIN — FOLIC ACID 1 MG: 1 TABLET ORAL at 08:02

## 2023-01-10 ASSESSMENT — ACTIVITIES OF DAILY LIVING (ADL)
ADLS_ACUITY_SCORE: 22
ADLS_ACUITY_SCORE: 37
ADLS_ACUITY_SCORE: 22

## 2023-01-10 NOTE — CONSULTS
1/10/2023      Met with pt via telephone via telephone to offer CD services. Pt reports she would not be interested in attending CD treatment at this time. Pt reports she intends to resume taking her prescriptions including naltrexone. Pt reports she is also planning to attend sober support meetings. Pt reports she doesn't need any CD resources she has meetings she can attend. Pt is able to call Mental Health and Addiction Services Line: [phone] and make an appt through [email or web address] if she would like an evaluation and referrals to CD treatment after she is discharged.    Alcoholics Anonymous  http://www.aa.org  Community Drug & Alcohol Support Resources   Alcoholics Anonymous   24/7 Phone Line: [phone]   https://[place].org/   For additional list of online meetings: http://aa-intergroup.org     CHRISTIAN Navas  Phone: [phone]  Email: [email or web address]

## 2023-01-10 NOTE — PROGRESS NOTES
[facility]    Medicine Progress Note - Hospitalist Service    Date of Admission:  1/9/2023    Assessment & Plan   Patient is a 42-year-old female with a history of alcohol abuse, with history of severe alcohol withdrawal coming to the ED reporting an unwitnessed alcohol withdrawal seizure    #Alcohol withdrawal syndrome  CIWA protocol with Valium  Case management consult for chemical dependency  Seizure precautions  Patient is also on gabapentin for alcohol withdrawal  Counseled her extensively on alcohol cessation    #Alcoholic hepatitis  Counseled on alcohol cessation  Continue supportive management        #Hypokalemia potassium replacement protocol      #Major depression  Hold Prozac due to interactions with Prozac and Valium can resume Prozac once her alcohol withdrawal resolves        #Thrombocytopenia  Likely due to alcohol use  Continue to check platelets daily    #Hypocalcemia on calcium replacement protocol                   Diet: Combination Diet Regular Diet Adult    DVT Prophylaxis: Low Risk/Ambulatory with no VTE prophylaxis indicated  White Catheter: Not present  Lines: None     Cardiac Monitoring: ACTIVE order. Indication: alcohol withdrawal  Code Status: Full Code      Clinically Significant Risk Factors        # Hypokalemia: Lowest K = 3.3 mmol/L in last 2 days, will replace as needed       # Hypoalbuminemia: Lowest albumin = 3 g/dL at 1/10/2023 11:10 AM, will monitor as appropriate   # Thrombocytopenia: Lowest platelets = 121 in last 2 days, will monitor for bleeding                 Disposition Plan      Expected Discharge Date: 01/11/2023                  Astrid Bravo MD  Hospitalist Service  [facility]  Securely message with [employer] (more info)  Text page via [employer] Paging/Directory   ______________________________________________________________________    Interval History   Patient seen and examined at bedside.  No seizures since  admission.  Receiving Valium as per CIWA scoring.  She is noting tremors.  No hallucinations  Physical Exam   Vital Signs: Temp: 98.1  F (36.7  C) Temp src: Oral BP: 112/78 Pulse: 77   Resp: 16 SpO2: 96 % O2 Device: None (Room air)    Weight: 121 lbs 11.1 oz   Physical Exam  Medical Decision Making       Data     I have personally reviewed the following data over the past 24 hrs:    4.1  \   12.0   / 121 (L)     136 101 10 /  98   3.5 28 0.82 \       ALT: 135 (H) AST: 184 (H) AP: 55 TBILI: 1.3   ALB: 3.0 (L) TOT PROTEIN: 6.2 (L) LIPASE: N/A

## 2023-01-10 NOTE — PLAN OF CARE
Goal Outcome Evaluation:DATE & TIME: 01/10/2023 6300- 9959  Cognitive Concerns/ Orientation :A&Ox4  BEHAVIOR & AGGRESSION TOOL COLOR:Green  CIWA SCORE: 11,5,6 valium x 1  ABNL VS/O2: VSS on RA  MOBILITY: Assist of 1, GB, unsteady  PAIN MANAGMENT:Denied  DIET: Regular diet  BOWEL/BLADDER: Continent  ABNL LAB/BG:K+ 3.5, LFTs are elevated (improving)  DRAIN/DEVICES: PIV SL  TELEMETRY RHYTHM: NSR.  SKIN: Intact, face flushed  TESTS/PROCEDURES: none  D/C DAY/GOALS/PLACE: TBD  OTHER IMPORTANT INFO: Chem dep consulted.

## 2023-01-10 NOTE — PLAN OF CARE
Goal Outcome Evaluation:    Summary:  Alcohol Withdraw Seizure Syndrome  DATE & TIME: 01/10/2023  Cognitive Concerns/ Orientation :A&Ox4  BEHAVIOR & AGGRESSION TOOL COLOR:Green  CIWA SCORE: 6,6  ABNL VS/O2: VSS on RA  MOBILITY: Assist of 1, GB, unsteady  PAIN MANAGMENT:Denied  DIET: Regular diet  BOWEL/BLADDER: Continent  ABNL LAB/BG:K+ 3.3  DRAIN/DEVICES: PIV SL  TELEMETRY RHYTHM: None  SKIN: Intact  TESTS/PROCEDURES:   D/C DAY/GOALS/PLACE: D  OTHER IMPORTANT INFO:

## 2023-01-10 NOTE — CONSULTS
Care Management Initial Consult    General Information  Responding to consult for Rule 25. This is non functional standing order.  The CD consult entered will provide assessment and address funding if needed.  Social Work will be availble to assist CD counselor as she requests.  Assessment completed with:  ,         Primary Care Provider verified and updated as needed:     Readmission within the last 30 days:           Advance Care Planning:            Communication Assessment  Patient's communication style:      Hearing Difficulty or Deaf: no   Wear Glasses or Blind: yes    Cognitive  Cognitive/Neuro/Behavioral: .WDL except  Level of Consciousness: alert  Arousal Level: opens eyes spontaneously  Orientation: oriented x 4     Best Language: 0 - No aphasia  Speech: clear, spontaneous    Living Environment:   People in home:       Current living Arrangements:        Able to return to prior arrangements:         Family/Social Support:  Care provided by:    Provides care for:                  Description of Support System:           Current Resources:   Patient receiving home care services:       Community Resources:    Equipment currently used at home: none  Supplies currently used at home:      Employment/Financial:  Employment Status:          Financial Concerns:             Lifestyle & Psychosocial Needs:  Social Determinants of Health     Tobacco Use: High Risk     Smoking Tobacco Use: Every Day     Smokeless Tobacco Use: Never     Passive Exposure: Not on file   Alcohol Use: Not on file   Financial Resource Strain: Not on file   Food Insecurity: Not on file   Transportation Needs: Not on file   Physical Activity: Not on file   Stress: Not on file   Social Connections: Not on file   Intimate Partner Violence: Not on file   Depression: At risk     PHQ-2 Score: 6   Housing Stability: Not on file       Functional Status:  Prior to admission patient needed assistance:              Mental Health Status:           Chemical Dependency Status:                Values/Beliefs:  Spiritual, Cultural Beliefs, Advent Practices, Values that affect care:                 Additional Infor    Mariangel Stafford, LICSW

## 2023-01-10 NOTE — PROGRESS NOTES
RECEIVING UNIT ED HANDOFF REVIEW    ED Nurse Handoff Report was reviewed by: Zuri Mares RN on January 10, 2023 at 12:29 AM

## 2023-01-11 LAB
ALBUMIN SERPL-MCNC: 3.1 G/DL (ref 3.4–5)
ALP SERPL-CCNC: 52 U/L (ref 40–150)
ALT SERPL W P-5'-P-CCNC: 120 U/L (ref 0–50)
ANION GAP SERPL CALCULATED.3IONS-SCNC: 5 MMOL/L (ref 3–14)
AST SERPL W P-5'-P-CCNC: 131 U/L (ref 0–45)
BILIRUB SERPL-MCNC: 1 MG/DL (ref 0.2–1.3)
BUN SERPL-MCNC: 10 MG/DL (ref 7–30)
CALCIUM SERPL-MCNC: 9.3 MG/DL (ref 8.5–10.1)
CHLORIDE BLD-SCNC: 101 MMOL/L (ref 94–109)
CO2 SERPL-SCNC: 31 MMOL/L (ref 20–32)
CREAT SERPL-MCNC: 0.73 MG/DL (ref 0.52–1.04)
ERYTHROCYTE [DISTWIDTH] IN BLOOD BY AUTOMATED COUNT: 10.7 % (ref 10–15)
GFR SERPL CREATININE-BSD FRML MDRD: >90 ML/MIN/1.73M2
GLUCOSE BLD-MCNC: 92 MG/DL (ref 70–99)
HCT VFR BLD AUTO: 35.3 % (ref 35–47)
HGB BLD-MCNC: 11.9 G/DL (ref 11.7–15.7)
MCH RBC QN AUTO: 34.2 PG (ref 26.5–33)
MCHC RBC AUTO-ENTMCNC: 33.7 G/DL (ref 31.5–36.5)
MCV RBC AUTO: 101 FL (ref 78–100)
PLATELET # BLD AUTO: 130 10E3/UL (ref 150–450)
POTASSIUM BLD-SCNC: 4.5 MMOL/L (ref 3.4–5.3)
PROT SERPL-MCNC: 6.3 G/DL (ref 6.8–8.8)
RBC # BLD AUTO: 3.48 10E6/UL (ref 3.8–5.2)
SODIUM SERPL-SCNC: 137 MMOL/L (ref 133–144)
WBC # BLD AUTO: 3.9 10E3/UL (ref 4–11)

## 2023-01-11 PROCEDURE — 85014 HEMATOCRIT: CPT | Performed by: STUDENT IN AN ORGANIZED HEALTH CARE EDUCATION/TRAINING PROGRAM

## 2023-01-11 PROCEDURE — 120N000001 HC R&B MED SURG/OB

## 2023-01-11 PROCEDURE — 99232 SBSQ HOSP IP/OBS MODERATE 35: CPT | Performed by: STUDENT IN AN ORGANIZED HEALTH CARE EDUCATION/TRAINING PROGRAM

## 2023-01-11 PROCEDURE — 250N000013 HC RX MED GY IP 250 OP 250 PS 637: Performed by: INTERNAL MEDICINE

## 2023-01-11 PROCEDURE — 80053 COMPREHEN METABOLIC PANEL: CPT | Performed by: STUDENT IN AN ORGANIZED HEALTH CARE EDUCATION/TRAINING PROGRAM

## 2023-01-11 PROCEDURE — 36415 COLL VENOUS BLD VENIPUNCTURE: CPT | Performed by: STUDENT IN AN ORGANIZED HEALTH CARE EDUCATION/TRAINING PROGRAM

## 2023-01-11 RX ADMIN — CLONIDINE HYDROCHLORIDE 0.1 MG: 0.1 TABLET ORAL at 03:07

## 2023-01-11 RX ADMIN — FOLIC ACID 1 MG: 1 TABLET ORAL at 08:05

## 2023-01-11 RX ADMIN — MULTIPLE VITAMINS W/ MINERALS TAB 1 TABLET: TAB at 08:05

## 2023-01-11 RX ADMIN — CLONIDINE HYDROCHLORIDE 0.1 MG: 0.1 TABLET ORAL at 19:07

## 2023-01-11 RX ADMIN — GABAPENTIN 900 MG: 300 CAPSULE ORAL at 08:07

## 2023-01-11 RX ADMIN — GABAPENTIN 900 MG: 300 CAPSULE ORAL at 00:14

## 2023-01-11 RX ADMIN — DIAZEPAM 10 MG: 5 TABLET ORAL at 03:07

## 2023-01-11 RX ADMIN — THIAMINE HCL TAB 100 MG 100 MG: 100 TAB at 08:05

## 2023-01-11 RX ADMIN — CLONIDINE HYDROCHLORIDE 0.1 MG: 0.1 TABLET ORAL at 12:29

## 2023-01-11 RX ADMIN — GABAPENTIN 900 MG: 300 CAPSULE ORAL at 18:20

## 2023-01-11 ASSESSMENT — ACTIVITIES OF DAILY LIVING (ADL)
ADLS_ACUITY_SCORE: 22

## 2023-01-11 NOTE — PLAN OF CARE
Goal Outcome Evaluation:      Plan of Care Reviewed With: patient    Overall Patient Progress: no changeOverall Patient Progress: no change       Summary:  Alcohol Withdraw Seizure Syndrome  DATE & TIME: 01/10-01/11 3045-3618  Cognitive Concerns/ Orientation: A&Ox4- sometimes wakes up and doesn't know where she is when having withdrawal symptoms.   BEHAVIOR & AGGRESSION TOOL COLOR:Green  CIWA SCORE: 8-10mg valium, 2, 8  ABNL VS/O2: VSS on RA  MOBILITY: Assist of 1, GB, unsteady  PAIN MANAGMENT:Denied  DIET: Regular   BOWEL/BLADDER: Continent  ABNL LAB/BG: Lft's elevated- improving   DRAIN/DEVICES: PIV SL  TELEMETRY RHYTHM: NSR.  SKIN: Intact, face flushed  TESTS/PROCEDURES: none  D/C DAY/GOALS/PLACE: TBD  OTHER IMPORTANT INFO: Chem dep consult refused. Pt plans to get treatment as out pt.    No action was needed

## 2023-01-11 NOTE — PLAN OF CARE
Goal Outcome Evaluation:  DATE & TIME: 01/11 2300- 1530  Cognitive Concerns/ Orientation: A&Ox4-forgetful   BEHAVIOR & AGGRESSION TOOL COLOR:Green  CIWA SCORE: 5,5(anxiety, tremors)  ABNL VS/O2: VSS on RA  MOBILITY: Assist of 1, GB, unsteady  PAIN MANAGMENT:Denied  DIET: Regular   BOWEL/BLADDER: Continent  ABNL LAB/BG: Lft's elevated- improving   DRAIN/DEVICES: PIV SL  TELEMETRY RHYTHM: NSR.  SKIN: Intact, face flushed  TESTS/PROCEDURES: none  D/C DAY/GOALS/PLACE: TBD  OTHER IMPORTANT INFO: Chem dep consult refused. Pt plans to get treatment as out pt.

## 2023-01-11 NOTE — PROGRESS NOTES
[facility]    Medicine Progress Note - Hospitalist Service    Date of Admission:  1/9/2023    Assessment & Plan   Patient is a 42-year-old female with a history of alcohol abuse, with history of severe alcohol withdrawal coming to the ED reporting an unwitnessed alcohol withdrawal seizure    #Alcohol withdrawal syndrome  CIWA protocol with Valium  Case management consult for chemical dependency, patient does not want inpatient detox center  Seizure precautions  Patient is also on gabapentin for alcohol withdrawal  Counseled her extensively on alcohol cessation    #Alcoholic hepatitis    Continue supportive management  LFT'S improving        #Hypokalemia potassium replacement protocol      #Major depression  Hold Prozac due to interactions with Prozac and Valium can resume Prozac once her alcohol withdrawal resolves        #Thrombocytopenia  Likely due to alcohol use  Continue to check platelets daily  Platelets 130     #Hypocalcemia on calcium replacement protocol                   Diet: Combination Diet Regular Diet Adult    DVT Prophylaxis: Low Risk/Ambulatory with no VTE prophylaxis indicated  White Catheter: Not present  Lines: None     Cardiac Monitoring: ACTIVE order. Indication: alcohol withdrawal  Code Status: Full Code      Clinically Significant Risk Factors              # Hypoalbuminemia: Lowest albumin = 3 g/dL at 1/10/2023 11:10 AM, will monitor as appropriate   # Thrombocytopenia: Lowest platelets = 121 in last 2 days, will monitor for bleeding                 Disposition Plan     Expected Discharge Date: 01/11/2023                  Astrid Bravo MD  Hospitalist Service  [facility]  Securely message with [employer] (more info)  Text page via [employer] Paging/Directory   ______________________________________________________________________    Interval History   Patient seen and examined at bedside.  No seizures since admission.  Receiving Valium  as per CIWA scoring.  She is noting tremors.  Has hallucinations last night   Mild abdominal pain   Physical Exam   Vital Signs: Temp: 97.4  F (36.3  C) Temp src: Oral BP: 136/79 Pulse: 70   Resp: 18 SpO2: 96 % O2 Device: None (Room air)    Weight: 121 lbs 11.1 oz   Physical Exam  Data     I have personally reviewed the following data over the past 24 hrs:    3.9 (L)  \   11.9   / 130 (L)     137 101 10 /  92   4.5 31 0.73 \       ALT: 120 (H) AST: 131 (H) AP: 52 TBILI: 1.0   ALB: 3.1 (L) TOT PROTEIN: 6.3 (L) LIPASE: N/A

## 2023-01-11 NOTE — PLAN OF CARE
Goal Outcome Evaluation:  Summary:  Alcohol Withdraw Seizure Syndrome  DATE & TIME: 01/10/2023 7503- 9425  Cognitive Concerns/ Orientation :A&Ox4  BEHAVIOR & AGGRESSION TOOL COLOR:Green  CIWA SCORE: 12, 4 valium x 1, effective.   ABNL VS/O2: VSS on RA  MOBILITY: Assist of 1, GB, unsteady  PAIN MANAGMENT:Denied  DIET: Regular diet, good appetite  BOWEL/BLADDER: Continent  ABNL LAB/BG:No labs, AM labs.   DRAIN/DEVICES: PIV SL  TELEMETRY RHYTHM: NSR.  SKIN: Intact, face flushed  TESTS/PROCEDURES: none  D/C DAY/GOALS/PLACE: D  OTHER IMPORTANT INFO: Chem dep consult refused. Pt plans to get treatment as out pt.

## 2023-01-12 LAB
ALBUMIN SERPL-MCNC: 3.2 G/DL (ref 3.4–5)
ALP SERPL-CCNC: 49 U/L (ref 40–150)
ALT SERPL W P-5'-P-CCNC: 109 U/L (ref 0–50)
ANION GAP SERPL CALCULATED.3IONS-SCNC: 7 MMOL/L (ref 3–14)
AST SERPL W P-5'-P-CCNC: 100 U/L (ref 0–45)
BILIRUB SERPL-MCNC: 0.5 MG/DL (ref 0.2–1.3)
BUN SERPL-MCNC: 11 MG/DL (ref 7–30)
CALCIUM SERPL-MCNC: 9.3 MG/DL (ref 8.5–10.1)
CHLORIDE BLD-SCNC: 101 MMOL/L (ref 94–109)
CO2 SERPL-SCNC: 31 MMOL/L (ref 20–32)
CREAT SERPL-MCNC: 0.79 MG/DL (ref 0.52–1.04)
ERYTHROCYTE [DISTWIDTH] IN BLOOD BY AUTOMATED COUNT: 10.8 % (ref 10–15)
GFR SERPL CREATININE-BSD FRML MDRD: >90 ML/MIN/1.73M2
GLUCOSE BLD-MCNC: 137 MG/DL (ref 70–99)
HCT VFR BLD AUTO: 34.5 % (ref 35–47)
HGB BLD-MCNC: 12.1 G/DL (ref 11.7–15.7)
MAGNESIUM SERPL-MCNC: 2 MG/DL (ref 1.6–2.3)
MCH RBC QN AUTO: 34.6 PG (ref 26.5–33)
MCHC RBC AUTO-ENTMCNC: 35.1 G/DL (ref 31.5–36.5)
MCV RBC AUTO: 99 FL (ref 78–100)
PLATELET # BLD AUTO: 166 10E3/UL (ref 150–450)
POTASSIUM BLD-SCNC: 4.1 MMOL/L (ref 3.4–5.3)
PROT SERPL-MCNC: 6.6 G/DL (ref 6.8–8.8)
RBC # BLD AUTO: 3.5 10E6/UL (ref 3.8–5.2)
SODIUM SERPL-SCNC: 139 MMOL/L (ref 133–144)
WBC # BLD AUTO: 4.7 10E3/UL (ref 4–11)

## 2023-01-12 PROCEDURE — 99232 SBSQ HOSP IP/OBS MODERATE 35: CPT | Performed by: STUDENT IN AN ORGANIZED HEALTH CARE EDUCATION/TRAINING PROGRAM

## 2023-01-12 PROCEDURE — 120N000001 HC R&B MED SURG/OB

## 2023-01-12 PROCEDURE — 36415 COLL VENOUS BLD VENIPUNCTURE: CPT | Performed by: STUDENT IN AN ORGANIZED HEALTH CARE EDUCATION/TRAINING PROGRAM

## 2023-01-12 PROCEDURE — 80053 COMPREHEN METABOLIC PANEL: CPT | Performed by: STUDENT IN AN ORGANIZED HEALTH CARE EDUCATION/TRAINING PROGRAM

## 2023-01-12 PROCEDURE — 250N000013 HC RX MED GY IP 250 OP 250 PS 637: Performed by: INTERNAL MEDICINE

## 2023-01-12 PROCEDURE — 83735 ASSAY OF MAGNESIUM: CPT | Performed by: STUDENT IN AN ORGANIZED HEALTH CARE EDUCATION/TRAINING PROGRAM

## 2023-01-12 PROCEDURE — 85027 COMPLETE CBC AUTOMATED: CPT | Performed by: STUDENT IN AN ORGANIZED HEALTH CARE EDUCATION/TRAINING PROGRAM

## 2023-01-12 RX ORDER — GABAPENTIN 300 MG/1
900 CAPSULE ORAL ONCE
Status: COMPLETED | OUTPATIENT
Start: 2023-01-12 | End: 2023-01-12

## 2023-01-12 RX ADMIN — CLONIDINE HYDROCHLORIDE 0.1 MG: 0.1 TABLET ORAL at 20:06

## 2023-01-12 RX ADMIN — CLONIDINE HYDROCHLORIDE 0.1 MG: 0.1 TABLET ORAL at 03:08

## 2023-01-12 RX ADMIN — CLONIDINE HYDROCHLORIDE 0.1 MG: 0.1 TABLET ORAL at 11:37

## 2023-01-12 RX ADMIN — GABAPENTIN 900 MG: 300 CAPSULE ORAL at 17:17

## 2023-01-12 RX ADMIN — THIAMINE HCL TAB 100 MG 100 MG: 100 TAB at 08:18

## 2023-01-12 RX ADMIN — MELATONIN 5 MG TABLET 5 MG: at 23:25

## 2023-01-12 RX ADMIN — MELATONIN 5 MG TABLET 5 MG: at 02:02

## 2023-01-12 RX ADMIN — FOLIC ACID 1 MG: 1 TABLET ORAL at 08:18

## 2023-01-12 RX ADMIN — HYDROXYZINE HYDROCHLORIDE 50 MG: 25 TABLET, FILM COATED ORAL at 00:06

## 2023-01-12 RX ADMIN — GABAPENTIN 600 MG: 300 CAPSULE ORAL at 23:22

## 2023-01-12 RX ADMIN — GABAPENTIN 900 MG: 300 CAPSULE ORAL at 08:18

## 2023-01-12 RX ADMIN — MULTIPLE VITAMINS W/ MINERALS TAB 1 TABLET: TAB at 08:18

## 2023-01-12 RX ADMIN — GABAPENTIN 900 MG: 300 CAPSULE ORAL at 00:06

## 2023-01-12 ASSESSMENT — ACTIVITIES OF DAILY LIVING (ADL)
ADLS_ACUITY_SCORE: 22

## 2023-01-12 NOTE — PROGRESS NOTES
[facility]    Medicine Progress Note - Hospitalist Service    Date of Admission:  1/9/2023    Assessment & Plan   Patient is a 42-year-old female with a history of alcohol abuse, with history of severe alcohol withdrawal coming to the ED reporting an unwitnessed alcohol withdrawal seizure    #Alcohol withdrawal syndrome Improving  CIWA protocol with Valium  Case management consult for chemical dependency, patient does not want inpatient detox center  Seizure precautions  Patient is also on gabapentin for alcohol withdrawal  Counseled her extensively on alcohol cessation  Also on gabapentin taper for withdrawal while in hospital     #Alcoholic hepatitis    Continue supportive management  LFT'S improving  Labs reviewed        #Hypokalemia  Resolved    #Major depression  Hold Prozac due to interactions with Prozac and Valium can resume Prozac once her alcohol withdrawal resolves        #Thrombocytopenia  Likely due to alcohol use  Continue to check platelets daily  Platelets 130     #Hypocalcemia Resolved                     Diet: Combination Diet Regular Diet Adult    DVT Prophylaxis: Low Risk/Ambulatory with no VTE prophylaxis indicated  White Catheter: Not present  Lines: None     Cardiac Monitoring: ACTIVE order. Indication: alcohol withdrawal  Code Status: Full Code      Clinically Significant Risk Factors              # Hypoalbuminemia: Lowest albumin = 3 g/dL at 1/10/2023 11:10 AM, will monitor as appropriate                   Disposition Plan  Discharge tomorrow     Expected Discharge Date: 01/13/2023                  Astrid Bravo MD  Hospitalist Service  [facility]  Securely message with [employer] (more info)  Text page via [employer] Paging/Directory   ______________________________________________________________________    Interval History   Patient seen and examined at bedside.  No seizures since admission.    Has hallucinations last night   No  hallucinations this morning   Mild abdominal pain which is intermittent   Physical Exam   Vital Signs: Temp: (P) 98.1  F (36.7  C) Temp src: (P) Oral BP: (P) 110/48 Pulse: (P) 86   Resp: (P) 17 SpO2: (P) 100 % O2 Device: (P) None (Room air)    Weight: 121 lbs 11.1 oz   Physical Exam  Data     I have personally reviewed the following data over the past 24 hrs:    4.7  \   12.1   / 166     139 101 11 /  137 (H)   4.1 31 0.79 \       ALT: 109 (H) AST: 100 (H) AP: 49 TBILI: 0.5   ALB: 3.2 (L) TOT PROTEIN: 6.6 (L) LIPASE: N/A

## 2023-01-12 NOTE — PLAN OF CARE
Goal Outcome Evaluation:  Summary:  Alcohol Withdraw Seizure Syndrome  DATE & TIME: 01/11 9938- 3318  Cognitive Concerns/ Orientation: A&Ox4-forgetful   BEHAVIOR & AGGRESSION TOOL COLOR:Green  CIWA SCORE: 3, 3. Some tremor and headache.  ABNL VS/O2: VSS on RA  MOBILITY: Assist of 1, GB, unsteady  PAIN MANAGMENT:Denied  DIET: Regular   BOWEL/BLADDER: Continent  ABNL LAB/BG: NA   DRAIN/DEVICES: PIV SL  TELEMETRY RHYTHM: NSR.  SKIN: Intact, face flushed  TESTS/PROCEDURES: none  D/C DAY/GOALS/PLACE: TBD  OTHER IMPORTANT INFO: Chem dep consult refused. Pt plans to get treatment as out pt. Pt doing much better.

## 2023-01-12 NOTE — PLAN OF CARE
Goal Outcome Evaluation:    EtOH Withdrawal.     A&O x 4. VSS on RA. Denies pain. Tele- A. Fib. Denies N/V/D. Seizure precautions, maintained. CIWA Q4, 6,5,5. No benzos given. SBA, steady on her feet. Diet is regular with good appetite. R PIV SL. Patient medically cleared for discharge. Discharge home tomorrow when cousin get back into town.

## 2023-01-12 NOTE — PLAN OF CARE
"Summary:  Alcohol Withdraw Seizure Syndrome  DATE & TIME: 01/12/23 3684-5862  Cognitive Concerns/ Orientation: A&Ox4-forgetful   BEHAVIOR & AGGRESSION TOOL COLOR:Green  CIWA SCORE: 2. Some tremor and fullness in head.  ABNL VS/O2: VSS on RA  MOBILITY: Assist of 1, GB, unsteady  PAIN MANAGMENT:Denied  DIET: Regular   BOWEL/BLADDER: Continent  ABNL LAB/BG: NA   DRAIN/DEVICES: PIV SL  TELEMETRY RHYTHM: NSR.  SKIN: Intact, face flushed  TESTS/PROCEDURES: none  D/C DAY/GOALS/PLACE: TBD  OTHER IMPORTANT INFO: Chem dep consult refused. Pt plans to get treatment as out pt. Pt doing much better.     Goal Outcome Evaluation:      Plan of Care Reviewed With: patient    Overall Patient Progress: no changeOverall Patient Progress: no change    Outcome Evaluation: Patient will meet criteria with care staff for safe discharge.      Problem: Plan of Care - These are the overarching goals to be used throughout the patient stay.    Goal: Plan of Care Review    Flowsheets (Taken 1/12/2023 0357)  Outcome Evaluation: Patient will meet criteria with care staff for safe discharge.  Plan of Care Reviewed With: patient  Overall Patient Progress: no change  Goal: Patient-Specific Goal (Individualized)    Flowsheets (Taken 1/12/2023 0357)  Individualized Care Needs: \"I dont' want to go home tomorrow.\"  Goal: Absence of Hospital-Acquired Illness or Injury  Intervention: Identify and Manage Fall Risk  Recent Flowsheet Documentation  Taken 1/12/2023 0012 by Aishwarya Betancourt, RN  Safety Promotion/Fall Prevention:    activity supervised    assistive device/personal items within reach    bed alarm on    clutter free environment maintained    fall prevention program maintained    nonskid shoes/slippers when out of bed    safety round/check completed    supervised activity  Intervention: Prevent Skin Injury  Recent Flowsheet Documentation  Taken 1/12/2023 0012 by Aishwarya Betancourt, RN  Body Position: position changed independently     Problem: Alcohol " Withdrawal  Goal: Alcohol Withdrawal Symptom Control  Intervention: Minimize or Manage Alcohol Withdrawal Symptoms  Recent Flowsheet Documentation  Taken 1/12/2023 0012 by Aishwarya Betancourt RN  Seizure Precautions:    activity supervised    side rails padded  Goal: Optimal Neurologic Function  Intervention: Prevent Seizure-Related Injury  Recent Flowsheet Documentation  Taken 1/12/2023 0012 by Aishwarya Betancourt, RN  Seizure Precautions:    activity supervised    side rails padded

## 2023-01-13 LAB
ALBUMIN SERPL-MCNC: 3.2 G/DL (ref 3.4–5)
ALP SERPL-CCNC: 49 U/L (ref 40–150)
ALT SERPL W P-5'-P-CCNC: 113 U/L (ref 0–50)
ANION GAP SERPL CALCULATED.3IONS-SCNC: 6 MMOL/L (ref 3–14)
AST SERPL W P-5'-P-CCNC: 94 U/L (ref 0–45)
BILIRUB SERPL-MCNC: 0.7 MG/DL (ref 0.2–1.3)
BUN SERPL-MCNC: 8 MG/DL (ref 7–30)
CALCIUM SERPL-MCNC: 9.1 MG/DL (ref 8.5–10.1)
CHLORIDE BLD-SCNC: 99 MMOL/L (ref 94–109)
CO2 SERPL-SCNC: 30 MMOL/L (ref 20–32)
CREAT SERPL-MCNC: 0.68 MG/DL (ref 0.52–1.04)
ERYTHROCYTE [DISTWIDTH] IN BLOOD BY AUTOMATED COUNT: 10.9 % (ref 10–15)
GFR SERPL CREATININE-BSD FRML MDRD: >90 ML/MIN/1.73M2
GLUCOSE BLD-MCNC: 92 MG/DL (ref 70–99)
HCT VFR BLD AUTO: 35.7 % (ref 35–47)
HGB BLD-MCNC: 12.6 G/DL (ref 11.7–15.7)
MAGNESIUM SERPL-MCNC: 2.2 MG/DL (ref 1.6–2.3)
MCH RBC QN AUTO: 35 PG (ref 26.5–33)
MCHC RBC AUTO-ENTMCNC: 35.3 G/DL (ref 31.5–36.5)
MCV RBC AUTO: 99 FL (ref 78–100)
PLATELET # BLD AUTO: 190 10E3/UL (ref 150–450)
POTASSIUM BLD-SCNC: 4.4 MMOL/L (ref 3.4–5.3)
PROT SERPL-MCNC: 6.6 G/DL (ref 6.8–8.8)
RBC # BLD AUTO: 3.6 10E6/UL (ref 3.8–5.2)
SODIUM SERPL-SCNC: 135 MMOL/L (ref 133–144)
WBC # BLD AUTO: 5.5 10E3/UL (ref 4–11)

## 2023-01-13 PROCEDURE — 120N000001 HC R&B MED SURG/OB

## 2023-01-13 PROCEDURE — 85027 COMPLETE CBC AUTOMATED: CPT | Performed by: STUDENT IN AN ORGANIZED HEALTH CARE EDUCATION/TRAINING PROGRAM

## 2023-01-13 PROCEDURE — 99232 SBSQ HOSP IP/OBS MODERATE 35: CPT | Performed by: STUDENT IN AN ORGANIZED HEALTH CARE EDUCATION/TRAINING PROGRAM

## 2023-01-13 PROCEDURE — 83735 ASSAY OF MAGNESIUM: CPT | Performed by: STUDENT IN AN ORGANIZED HEALTH CARE EDUCATION/TRAINING PROGRAM

## 2023-01-13 PROCEDURE — 250N000013 HC RX MED GY IP 250 OP 250 PS 637: Performed by: INTERNAL MEDICINE

## 2023-01-13 PROCEDURE — 36415 COLL VENOUS BLD VENIPUNCTURE: CPT | Performed by: STUDENT IN AN ORGANIZED HEALTH CARE EDUCATION/TRAINING PROGRAM

## 2023-01-13 PROCEDURE — 80053 COMPREHEN METABOLIC PANEL: CPT | Performed by: STUDENT IN AN ORGANIZED HEALTH CARE EDUCATION/TRAINING PROGRAM

## 2023-01-13 RX ADMIN — GABAPENTIN 600 MG: 300 CAPSULE ORAL at 06:12

## 2023-01-13 RX ADMIN — HYDROXYZINE HYDROCHLORIDE 50 MG: 25 TABLET, FILM COATED ORAL at 18:34

## 2023-01-13 RX ADMIN — CLONIDINE HYDROCHLORIDE 0.1 MG: 0.1 TABLET ORAL at 03:10

## 2023-01-13 RX ADMIN — FOLIC ACID 1 MG: 1 TABLET ORAL at 08:52

## 2023-01-13 RX ADMIN — MULTIPLE VITAMINS W/ MINERALS TAB 1 TABLET: TAB at 08:52

## 2023-01-13 RX ADMIN — MELATONIN 5 MG TABLET 5 MG: at 22:40

## 2023-01-13 RX ADMIN — HYDROXYZINE HYDROCHLORIDE 50 MG: 25 TABLET, FILM COATED ORAL at 09:02

## 2023-01-13 RX ADMIN — CLONIDINE HYDROCHLORIDE 0.1 MG: 0.1 TABLET ORAL at 18:36

## 2023-01-13 RX ADMIN — HYDROXYZINE HYDROCHLORIDE 50 MG: 25 TABLET, FILM COATED ORAL at 22:39

## 2023-01-13 RX ADMIN — GABAPENTIN 600 MG: 300 CAPSULE ORAL at 22:39

## 2023-01-13 RX ADMIN — CLONIDINE HYDROCHLORIDE 0.1 MG: 0.1 TABLET ORAL at 10:45

## 2023-01-13 RX ADMIN — GABAPENTIN 600 MG: 300 CAPSULE ORAL at 14:05

## 2023-01-13 RX ADMIN — THIAMINE HCL TAB 100 MG 100 MG: 100 TAB at 08:52

## 2023-01-13 ASSESSMENT — ACTIVITIES OF DAILY LIVING (ADL)
ADLS_ACUITY_SCORE: 22

## 2023-01-13 NOTE — CONSULTS
Care Management Initial Consult    General Information Per Dr Nova, patient is stable for discharge however reports she is homeless.  Assessment completed with: patient.  Patient does not have a residence.  On Saturday, her cousin is able to come and pick her up to bring patient back to their home.  The cousin can be here at 1000.  Writer explained she will be able to stay here till tomorrow however we request she discharge by 1100.  Dr Nova and bedside RN updated.         Primary Care Provider verified and updated as needed:     Readmission within the last 30 days:           Advance Care Planning:            Communication Assessment  Patient's communication style: spoken language (English or Bilingual)    Hearing Difficulty or Deaf: no   Wear Glasses or Blind: yes    Cognitive  Cognitive/Neuro/Behavioral: WDL  Level of Consciousness: alert  Arousal Level: opens eyes spontaneously  Orientation: oriented x 4  Mood/Behavior: calm, cooperative  Best Language: 0 - No aphasia  Speech: clear, spontaneous, logical    Living Environment:   People in home:       Current living Arrangements:        Able to return to prior arrangements:         Family/Social Support:  Care provided by:    Provides care for:                  Description of Support System:           Current Resources:   Patient receiving home care services:       Community Resources:    Equipment currently used at home: none  Supplies currently used at home:      Employment/Financial:  Employment Status:          Financial Concerns:             Lifestyle & Psychosocial Needs:  Social Determinants of Health     Tobacco Use: High Risk     Smoking Tobacco Use: Every Day     Smokeless Tobacco Use: Never     Passive Exposure: Not on file   Alcohol Use: Not on file   Financial Resource Strain: Not on file   Food Insecurity: Not on file   Transportation Needs: Not on file   Physical Activity: Not on file   Stress: Not on file   Social Connections: Not on file  "  Intimate Partner Violence: Not on file   Depression: At risk     PHQ-2 Score: 6   Housing Stability: Not on file       Functional Status:  Prior to admission patient needed assistance:              Mental Health Status:          Chemical Dependency Status:   Patient provided opportunity for CD assessment.  Per CD note,  \"Pt reports she intends to resume taking her prescriptions including naltrexone. Pt reports she is also planning to attend sober support meetings. Pt reports she doesn't need any CD resources she has meetings she can attend\"          Values/Beliefs:  Spiritual, Cultural Beliefs, Scientologist Practices, Values that affect care:                 Additional Information:      Mariangel Stafford, [facility]      "

## 2023-01-13 NOTE — PLAN OF CARE
Goal Outcome Evaluation:         Summary:  Alcohol Withdraw Seizure Syndrome  DATE & TIME: 01/12/23-1/13/23 3658-9122  Cognitive Concerns/ Orientation: A&Ox4-forgetful   BEHAVIOR & AGGRESSION TOOL COLOR:Green  CIWA SCORE: 0,0,0  ABNL VS/O2: VSS on RA  MOBILITY:SBA, steady on feet   PAIN MANAGMENT:Denied  DIET: Regular   BOWEL/BLADDER: Continent  ABNL LAB/BG: NA   DRAIN/DEVICES: PIV SL  TELEMETRY RHYTHM: NSR.  SKIN: Intact, face flushed  TESTS/PROCEDURES: none  D/C DAY/GOALS/PLACE: Possibly home with cousin today 1/13  OTHER IMPORTANT INFO: Chem dep consult refused. Pt plans to get treatment as out pt.   Melatonin x1

## 2023-01-13 NOTE — PROGRESS NOTES
[facility]    Medicine Progress Note - Hospitalist Service    Date of Admission:  1/9/2023    Assessment & Plan   Patient is a 42-year-old female with a history of alcohol abuse, with history of severe alcohol withdrawal coming to the ED reporting an unwitnessed alcohol withdrawal seizure    #Alcohol withdrawal syndrome Improving  CIWA protocol with Valium  Case management consult for chemical dependency, patient does not want inpatient detox center  Seizure precautions  Patient is also on gabapentin for alcohol withdrawal  Counseled her extensively on alcohol cessation  Also on gabapentin taper for withdrawal while in hospital   Not required valium today     #Alcoholic hepatitis    Continue supportive management  LFT'S improving  Labs reviewed        #Hypokalemia  Resolved    #Major depression  Hold Prozac due to interactions with Prozac and Valium can resume Prozac once her alcohol withdrawal resolves        #Thrombocytopenia Resolved  Likely due to alcohol use  Continue to check platelets daily  Platelets 190    #Hypocalcemia Resolved            # Dispo medically stable.Does not hava a residence and will be discharged tomorrow when her cousin will pick her up         Diet: Combination Diet Regular Diet Adult    DVT Prophylaxis: Low Risk/Ambulatory with no VTE prophylaxis indicated  White Catheter: Not present  Lines: None     Cardiac Monitoring: ACTIVE order. Indication: alcohol withdrawal  Code Status: Full Code      Clinically Significant Risk Factors              # Hypoalbuminemia: Lowest albumin = 3 g/dL at 1/10/2023 11:10 AM, will monitor as appropriate                   Disposition Plan  Discharge tomorrow     Expected Discharge Date: 01/14/2023, 11:00 AM                Astrid Bravo MD  Hospitalist Service  [facility]  Securely message with [employer] (more info)  Text page via [employer] Paging/Directory    ______________________________________________________________________    Interval History   Patient seen and examined at bedside.  No seizures since admission.    H  Mild abdominal pain which is intermittent   Physical Exam   Vital Signs: Temp: 97.4  F (36.3  C) Temp src: Axillary BP: 99/57 Pulse: 65   Resp: 18 SpO2: 99 % O2 Device: None (Room air)    Weight: 121 lbs 11.1 oz   Physical Exam  Data     I have personally reviewed the following data over the past 24 hrs:    5.5  \   12.6   / 190     135 99 8 /  92   4.4 30 0.68 \       ALT: 113 (H) AST: 94 (H) AP: 49 TBILI: 0.7   ALB: 3.2 (L) TOT PROTEIN: 6.6 (L) LIPASE: N/A

## 2023-01-14 VITALS
TEMPERATURE: 97.9 F | BODY MASS INDEX: 22.39 KG/M2 | HEIGHT: 62 IN | HEART RATE: 69 BPM | OXYGEN SATURATION: 97 % | SYSTOLIC BLOOD PRESSURE: 88 MMHG | DIASTOLIC BLOOD PRESSURE: 47 MMHG | WEIGHT: 121.69 LBS | RESPIRATION RATE: 18 BRPM

## 2023-01-14 LAB
ALBUMIN SERPL-MCNC: 3.4 G/DL (ref 3.4–5)
ALP SERPL-CCNC: 50 U/L (ref 40–150)
ALT SERPL W P-5'-P-CCNC: 121 U/L (ref 0–50)
ANION GAP SERPL CALCULATED.3IONS-SCNC: 6 MMOL/L (ref 3–14)
AST SERPL W P-5'-P-CCNC: 101 U/L (ref 0–45)
BILIRUB SERPL-MCNC: 0.9 MG/DL (ref 0.2–1.3)
BUN SERPL-MCNC: 9 MG/DL (ref 7–30)
CALCIUM SERPL-MCNC: 9.5 MG/DL (ref 8.5–10.1)
CHLORIDE BLD-SCNC: 100 MMOL/L (ref 94–109)
CO2 SERPL-SCNC: 30 MMOL/L (ref 20–32)
CREAT SERPL-MCNC: 0.76 MG/DL (ref 0.52–1.04)
ERYTHROCYTE [DISTWIDTH] IN BLOOD BY AUTOMATED COUNT: 10.9 % (ref 10–15)
GFR SERPL CREATININE-BSD FRML MDRD: >90 ML/MIN/1.73M2
GLUCOSE BLD-MCNC: 104 MG/DL (ref 70–99)
HCT VFR BLD AUTO: 39.1 % (ref 35–47)
HGB BLD-MCNC: 13 G/DL (ref 11.7–15.7)
MCH RBC QN AUTO: 34.1 PG (ref 26.5–33)
MCHC RBC AUTO-ENTMCNC: 33.2 G/DL (ref 31.5–36.5)
MCV RBC AUTO: 103 FL (ref 78–100)
PLATELET # BLD AUTO: 219 10E3/UL (ref 150–450)
POTASSIUM BLD-SCNC: 4.7 MMOL/L (ref 3.4–5.3)
PROT SERPL-MCNC: 7 G/DL (ref 6.8–8.8)
RBC # BLD AUTO: 3.81 10E6/UL (ref 3.8–5.2)
SODIUM SERPL-SCNC: 136 MMOL/L (ref 133–144)
WBC # BLD AUTO: 5.5 10E3/UL (ref 4–11)

## 2023-01-14 PROCEDURE — 85027 COMPLETE CBC AUTOMATED: CPT | Performed by: STUDENT IN AN ORGANIZED HEALTH CARE EDUCATION/TRAINING PROGRAM

## 2023-01-14 PROCEDURE — 80053 COMPREHEN METABOLIC PANEL: CPT | Performed by: STUDENT IN AN ORGANIZED HEALTH CARE EDUCATION/TRAINING PROGRAM

## 2023-01-14 PROCEDURE — 36415 COLL VENOUS BLD VENIPUNCTURE: CPT | Performed by: STUDENT IN AN ORGANIZED HEALTH CARE EDUCATION/TRAINING PROGRAM

## 2023-01-14 PROCEDURE — 99239 HOSP IP/OBS DSCHRG MGMT >30: CPT | Performed by: STUDENT IN AN ORGANIZED HEALTH CARE EDUCATION/TRAINING PROGRAM

## 2023-01-14 PROCEDURE — 250N000013 HC RX MED GY IP 250 OP 250 PS 637: Performed by: INTERNAL MEDICINE

## 2023-01-14 RX ORDER — POLYETHYLENE GLYCOL 3350 17 G/17G
1 POWDER, FOR SOLUTION ORAL DAILY
Qty: 30 PACKET | Refills: 0 | Status: SHIPPED | OUTPATIENT
Start: 2023-01-14 | End: 2023-02-13

## 2023-01-14 RX ORDER — HYDROXYZINE HYDROCHLORIDE 25 MG/1
12.5 TABLET, FILM COATED ORAL EVERY 4 HOURS PRN
Qty: 5 TABLET | Refills: 0 | Status: SHIPPED | OUTPATIENT
Start: 2023-01-14 | End: 2023-01-17

## 2023-01-14 RX ADMIN — FOLIC ACID 1 MG: 1 TABLET ORAL at 08:16

## 2023-01-14 RX ADMIN — MULTIPLE VITAMINS W/ MINERALS TAB 1 TABLET: TAB at 08:16

## 2023-01-14 RX ADMIN — CLONIDINE HYDROCHLORIDE 0.1 MG: 0.1 TABLET ORAL at 03:16

## 2023-01-14 RX ADMIN — GABAPENTIN 600 MG: 300 CAPSULE ORAL at 06:11

## 2023-01-14 RX ADMIN — HYDROXYZINE HYDROCHLORIDE 50 MG: 25 TABLET, FILM COATED ORAL at 06:11

## 2023-01-14 ASSESSMENT — ACTIVITIES OF DAILY LIVING (ADL)
ADLS_ACUITY_SCORE: 22

## 2023-01-14 NOTE — PLAN OF CARE
Goal Outcome Evaluation:      Plan of Care Reviewed With: patient    Summary:  Alcohol Withdraw Seizure Syndrome  DATE & TIME: 1/13/23 6198-1958  Cognitive Concerns/ Orientation: A&Ox4-forgetful   BEHAVIOR & AGGRESSION TOOL COLOR:Green  CIWA SCORE: 2 -for anxiety,0,0  ABNL VS/O2: VSS on RA  MOBILITY: Ind, steady on feet  PAIN MANAGMENT:Denied  DIET: Regular   BOWEL/BLADDER: Continent  ABNL LAB/BG: NA   DRAIN/DEVICES: PIV SL  TELEMETRY RHYTHM: Sinus Dion.  SKIN: Intact, face flushed  TESTS/PROCEDURES: none  D/C DAY/GOALS/PLACE: To home 1/14, cousin to pick her up around 2365-4570  OTHER IMPORTANT INFO: Chem dep consult refused. Pt plans to get treatment as out pt.   Atarax x1 for anxiety- was affective

## 2023-01-14 NOTE — PLAN OF CARE
Goal Outcome Evaluation:      Plan of Care Reviewed With: patient    Overall Patient Progress: no change    DATE & TIME: 1/13/23 1857-3492  Cognitive Concerns/ Orientation: A&O x4, calm & cooperative. Reports intermittent anxiety, PRN Atarax given x2 with relief   BEHAVIOR & AGGRESSION TOOL COLOR:Green  CIWA SCORE: 0, 1, 0  ABNL VS/O2: VSS on RA  MOBILITY: Independent, steady  PAIN MANAGMENT: Denies  DIET: Regular   BOWEL/BLADDER: Continent  ABNL LAB/BG: NA   DRAIN/DEVICES: PIV SL  TELEMETRY RHYTHM: SB/NSR  SKIN: Intact, face flushed  D/C DAY/GOALS/PLACE: To home Saturday 1/14, cousin to pick her up around 2050-4835  OTHER IMPORTANT INFO: Chem dep consult refused.

## 2023-01-14 NOTE — PLAN OF CARE
Goal Outcome Evaluation:      Plan of Care Reviewed With: patient    Summary:  Alcohol Withdraw Seizure Syndrome  DATE & TIME: 1/14/23 0814-3041  Cognitive Concerns/ Orientation: A&O x4, calm & cooperative. Reports intermittent anxiety  BEHAVIOR & AGGRESSION TOOL COLOR:Green  CIWA SCORE: 0  ABNL VS/O2: VSS on RA  MOBILITY: Independent, steady  PAIN MANAGMENT: Denies  DIET: Regular   BOWEL/BLADDER: Continent  ABNL LAB/BG: NA   DRAIN/DEVICES: PIV removed for discharge   TELEMETRY RHYTHM: removed for discharge   SKIN: Intact, face flushed  D/C DAY/GOALS/PLACE: To home Saturday 1/14, cousin to pick her up around 1100  OTHER IMPORTANT INFO: Chem dep consult refused.    Discharge    Patient discharged to home via private transport with belongings  Care plan note see above     Listed belongings gathered and given to patient (including from security/pharmacy). Yes  Care Plan and Patient education resolved: Yes  Prescriptions if needed, hard copies sent with patient  NA  Medication Bin checked and emptied on discharge Yes  SW/care coordinator/charge RN aware of discharge: Yes

## 2023-01-14 NOTE — DISCHARGE SUMMARY
[facility]  Hospitalist Discharge Summary      Date of Admission:  1/9/2023  Date of Discharge:  1/14/2023 11:34 AM  Discharging Provider: Astrid Bravo MD  Discharge Service: Hospitalist Service    Discharge Diagnoses   Alcohol withdrawal seizure    Follow-ups Needed After Discharge   Follow-up Appointments     Follow-up and recommended labs and tests       Follow up with primary care provider, Physician No Ref-Primary, within 7   days for hospital follow- up.           Unresulted Labs Ordered in the Past 30 Days of this Admission     No orders found from 12/10/2022 to 1/10/2023.          Discharge Disposition   Discharged to home  Condition at discharge: Stable    Hospital Course   Patient is a 42-year-old female with history of alcohol abuse, alcoholic withdrawal delirium, anxiety, depression, tobacco use disorder coming to the ER with possible  Redness alcohol withdrawal seizure.  Patient was started CIWA protocol with Valium and also gabapentin for alcohol withdrawal.  Her alcohol withdrawal symptoms improved.  She has been seizure free.  Patient had mild elevation of LFTs and I counseled her extensively on cessation of alcohol.  Case management was consulted for helping her with detox but she chose not to go to an inpatient detox facility and she wants to follow-up as an outpatient.  She will continue taking her naltrexone.  She did request to get a give a refill for hydroxyzine and give her hydroxyzine for 2 days which is a home medication for anxiety until she sees her primary care provider.  I also gave her a bowel regimen for constipation.  Consultations This Hospital Stay   CHEMICAL DEPENDENCY IP CONSULT  CARE MANAGEMENT / SOCIAL WORK IP CONSULT  CARE MANAGEMENT / SOCIAL WORK IP CONSULT    Code Status   Full Code    Time Spent on this Encounter   I, Astrid Bravo MD, personally saw the patient today and spent greater than 30 minutes discharging this  patient.       Astrid Bravo MD  [place] [address] [facility]  [address] [address]  Phone: [phone]  ______________________________________________________________________    Physical Exam   Vital Signs: Temp: 97.9  F (36.6  C) Temp src: Oral BP: (!) 88/47 Pulse: 69   Resp: 18 SpO2: 97 % O2 Device: None (Room air)    Weight: 121 lbs 11.1 oz  Physical Exam  Cardiovascular:      Rate and Rhythm: Normal rate and regular rhythm.      Heart sounds: Normal heart sounds. No murmur heard.    No gallop.   Pulmonary:      Effort: Pulmonary effort is normal. No respiratory distress.   Abdominal:      General: There is no distension.      Palpations: Abdomen is soft.      Tenderness: There is no abdominal tenderness.   Musculoskeletal:      Right lower leg: No edema.      Left lower leg: No edema.          Primary Care Physician   Physician No Ref-Primary    Discharge Orders      Reason for your hospital stay    Alcohol withdrawal seizure     Follow-up and recommended labs and tests     Follow up with primary care provider, Physician No Ref-Primary, within 7 days for hospital follow- up.     Activity    Your activity upon discharge: activity as tolerated     Diet    Follow this diet upon discharge: Orders Placed This Encounter      Combination Diet Regular Diet Adult       Significant Results and Procedures   No results found for this or any previous visit.    Discharge Medications   Current Discharge Medication List      START taking these medications    Details   polyethylene glycol (MIRALAX) 17 g packet Take 17 g by mouth daily for 30 days  Qty: 30 packet, Refills: 0    Associated Diagnoses: Constipation, unspecified constipation type         CONTINUE these medications which have CHANGED    Details   hydrOXYzine (ATARAX) 25 MG tablet Take 0.5 tablets (12.5 mg) by mouth every 4 hours as needed for anxiety  Qty: 5 tablet, Refills: 0    Associated Diagnoses: Anxiety          CONTINUE these medications which have NOT CHANGED    Details   atomoxetine (STRATTERA) 80 MG capsule Take 80 mg by mouth daily      FLUoxetine (PROZAC) 20 MG capsule Take 60 mg by mouth daily      naltrexone (DEPADE/REVIA) 50 MG tablet Take 100 mg by mouth daily           Allergies   Allergies   Allergen Reactions     Bupropion      Other reaction(s): Convulsions  Severity: Unknown; Type: Drug Allergy;

## 2023-01-17 ENCOUNTER — PATIENT OUTREACH (OUTPATIENT)
Dept: CARE COORDINATION | Facility: CLINIC | Age: 43
End: 2023-01-17

## 2023-01-17 NOTE — PROGRESS NOTES
[facility] Contact  [facility]/Voicemail     Clinical Data: Transitional Care Management Outreach     Outreach attempted x 2.  Left message on patient's voicemail, providing [facility]'s 24/7 scheduling and nurse triage phone number [phone] ([phone]) for questions/concerns and/or to schedule an appt with an [facility] provider, if they do not have a PCP.      Plan:  [facility] will do no further outreaches at this time.       Sunshine Salcido  Community Health Worker  [facility], [facility]  [phone]      *Connected Care Resource Team does NOT follow patient ongoing. Referrals are identified based on internal discharge reports and the outreach is to ensure patient has an understanding of their discharge instructions.